# Patient Record
Sex: FEMALE | Race: WHITE | NOT HISPANIC OR LATINO | Employment: UNEMPLOYED | URBAN - METROPOLITAN AREA
[De-identification: names, ages, dates, MRNs, and addresses within clinical notes are randomized per-mention and may not be internally consistent; named-entity substitution may affect disease eponyms.]

---

## 2017-01-01 ENCOUNTER — HOSPITAL ENCOUNTER (EMERGENCY)
Facility: HOSPITAL | Age: 37
Discharge: HOME | End: 2017-01-02
Admitting: EMERGENCY MEDICINE

## 2017-01-01 ENCOUNTER — APPOINTMENT (EMERGENCY)
Dept: CT IMAGING | Facility: HOSPITAL | Age: 37
End: 2017-01-01

## 2017-01-01 DIAGNOSIS — S32.029A: Primary | ICD-10-CM

## 2017-01-01 DIAGNOSIS — S32.039A: ICD-10-CM

## 2017-01-01 LAB
ANION GAP SERPL CALCULATED.3IONS-SCNC: 9 MMOL/L (ref 4–13)
BASOPHILS # BLD AUTO: 0.06 THOUSANDS/ΜL (ref 0–0.1)
BASOPHILS NFR BLD AUTO: 1 % (ref 0–1)
BUN SERPL-MCNC: 18 MG/DL (ref 5–25)
CALCIUM SERPL-MCNC: 8.9 MG/DL (ref 8.3–10.1)
CHLORIDE SERPL-SCNC: 107 MMOL/L (ref 100–108)
CLARITY, POC: CLEAR
CO2 SERPL-SCNC: 27 MMOL/L (ref 21–32)
COLOR, POC: YELLOW
CREAT SERPL-MCNC: 0.76 MG/DL (ref 0.6–1.3)
EOSINOPHIL # BLD AUTO: 0.05 THOUSAND/ΜL (ref 0–0.61)
EOSINOPHIL NFR BLD AUTO: 1 % (ref 0–6)
ERYTHROCYTE [DISTWIDTH] IN BLOOD BY AUTOMATED COUNT: 11.9 % (ref 11.6–15.1)
EXT BILIRUBIN, UA: NORMAL
EXT BLOOD URINE: NORMAL
EXT GLUCOSE, UA: NORMAL
EXT KETONES: NORMAL
EXT NITRITE, UA: NORMAL
EXT PH, UA: 6
EXT PROTEIN, UA: NORMAL
EXT SPECIFIC GRAVITY, UA: 1.02
EXT UROBILINOGEN: 0.2
GFR SERPL CREATININE-BSD FRML MDRD: >60 ML/MIN/1.73SQ M
GLUCOSE SERPL-MCNC: 116 MG/DL (ref 65–140)
HCG UR QL: NORMAL
HCT VFR BLD AUTO: 41.2 % (ref 34.8–46.1)
HGB BLD-MCNC: 13.7 G/DL (ref 11.5–15.4)
LYMPHOCYTES # BLD AUTO: 2.59 THOUSANDS/ΜL (ref 0.6–4.47)
LYMPHOCYTES NFR BLD AUTO: 24 % (ref 14–44)
MCH RBC QN AUTO: 29.8 PG (ref 26.8–34.3)
MCHC RBC AUTO-ENTMCNC: 33.3 G/DL (ref 31.4–37.4)
MCV RBC AUTO: 90 FL (ref 82–98)
MONOCYTES # BLD AUTO: 0.48 THOUSAND/ΜL (ref 0.17–1.22)
MONOCYTES NFR BLD AUTO: 5 % (ref 4–12)
NEUTROPHILS # BLD AUTO: 7.5 THOUSANDS/ΜL (ref 1.85–7.62)
NEUTS SEG NFR BLD AUTO: 70 % (ref 43–75)
NRBC BLD AUTO-RTO: 0 /100 WBCS
PLATELET # BLD AUTO: 258 THOUSANDS/UL (ref 149–390)
PMV BLD AUTO: 9.4 FL (ref 8.9–12.7)
POTASSIUM SERPL-SCNC: 3.5 MMOL/L (ref 3.5–5.3)
RBC # BLD AUTO: 4.6 MILLION/UL (ref 3.81–5.12)
SODIUM SERPL-SCNC: 143 MMOL/L (ref 136–145)
WBC # BLD AUTO: 10.71 THOUSAND/UL (ref 4.31–10.16)
WBC # BLD EST: NORMAL 10*3/UL

## 2017-01-01 PROCEDURE — 36415 COLL VENOUS BLD VENIPUNCTURE: CPT | Performed by: PHYSICIAN ASSISTANT

## 2017-01-01 PROCEDURE — 81002 URINALYSIS NONAUTO W/O SCOPE: CPT | Performed by: PHYSICIAN ASSISTANT

## 2017-01-01 PROCEDURE — 74177 CT ABD & PELVIS W/CONTRAST: CPT

## 2017-01-01 PROCEDURE — 81025 URINE PREGNANCY TEST: CPT | Performed by: PHYSICIAN ASSISTANT

## 2017-01-01 PROCEDURE — 96376 TX/PRO/DX INJ SAME DRUG ADON: CPT

## 2017-01-01 PROCEDURE — 70450 CT HEAD/BRAIN W/O DYE: CPT

## 2017-01-01 PROCEDURE — 72125 CT NECK SPINE W/O DYE: CPT

## 2017-01-01 PROCEDURE — 85025 COMPLETE CBC W/AUTO DIFF WBC: CPT | Performed by: PHYSICIAN ASSISTANT

## 2017-01-01 PROCEDURE — 96361 HYDRATE IV INFUSION ADD-ON: CPT

## 2017-01-01 PROCEDURE — 96374 THER/PROPH/DIAG INJ IV PUSH: CPT

## 2017-01-01 PROCEDURE — 80048 BASIC METABOLIC PNL TOTAL CA: CPT | Performed by: PHYSICIAN ASSISTANT

## 2017-01-01 RX ADMIN — SODIUM CHLORIDE 1000 ML: 0.9 INJECTION, SOLUTION INTRAVENOUS at 21:48

## 2017-01-01 RX ADMIN — IOHEXOL 100 ML: 350 INJECTION, SOLUTION INTRAVENOUS at 23:17

## 2017-01-01 RX ADMIN — HYDROMORPHONE HYDROCHLORIDE 0.5 MG: 1 INJECTION, SOLUTION INTRAMUSCULAR; INTRAVENOUS; SUBCUTANEOUS at 21:43

## 2017-01-01 RX ADMIN — HYDROMORPHONE HYDROCHLORIDE 0.5 MG: 1 INJECTION, SOLUTION INTRAMUSCULAR; INTRAVENOUS; SUBCUTANEOUS at 22:56

## 2017-01-02 VITALS
BODY MASS INDEX: 22.76 KG/M2 | HEIGHT: 67 IN | WEIGHT: 145 LBS | OXYGEN SATURATION: 100 % | SYSTOLIC BLOOD PRESSURE: 112 MMHG | HEART RATE: 78 BPM | RESPIRATION RATE: 18 BRPM | DIASTOLIC BLOOD PRESSURE: 78 MMHG | TEMPERATURE: 98.5 F

## 2017-01-02 PROCEDURE — 99284 EMERGENCY DEPT VISIT MOD MDM: CPT

## 2017-01-02 RX ORDER — OXYCODONE HYDROCHLORIDE AND ACETAMINOPHEN 5; 325 MG/1; MG/1
1 TABLET ORAL EVERY 4 HOURS PRN
Qty: 20 TABLET | Refills: 0 | Status: SHIPPED | OUTPATIENT
Start: 2017-01-02 | End: 2017-01-12

## 2017-01-02 RX ORDER — METHOCARBAMOL 500 MG/1
500 TABLET, FILM COATED ORAL 3 TIMES DAILY PRN
Qty: 21 TABLET | Refills: 0 | Status: SHIPPED | OUTPATIENT
Start: 2017-01-02 | End: 2017-02-01

## 2017-01-02 RX ORDER — IBUPROFEN 600 MG/1
600 TABLET ORAL EVERY 8 HOURS PRN
Qty: 30 TABLET | Refills: 0 | Status: SHIPPED | OUTPATIENT
Start: 2017-01-02 | End: 2017-02-01

## 2017-01-02 RX ORDER — DIAZEPAM 5 MG/1
5 TABLET ORAL ONCE
Status: COMPLETED | OUTPATIENT
Start: 2017-01-02 | End: 2017-01-02

## 2017-01-02 RX ADMIN — DIAZEPAM 5 MG: 5 TABLET ORAL at 00:33

## 2022-11-01 ENCOUNTER — OFFICE VISIT (OUTPATIENT)
Dept: FAMILY MEDICINE CLINIC | Facility: CLINIC | Age: 42
End: 2022-11-01

## 2022-11-01 VITALS
OXYGEN SATURATION: 99 % | BODY MASS INDEX: 22.29 KG/M2 | WEIGHT: 142 LBS | HEIGHT: 67 IN | SYSTOLIC BLOOD PRESSURE: 120 MMHG | RESPIRATION RATE: 16 BRPM | DIASTOLIC BLOOD PRESSURE: 68 MMHG | TEMPERATURE: 98.3 F | HEART RATE: 68 BPM

## 2022-11-01 DIAGNOSIS — Z12.31 BREAST CANCER SCREENING BY MAMMOGRAM: ICD-10-CM

## 2022-11-01 DIAGNOSIS — Z87.442 HISTORY OF NEPHROLITHIASIS: ICD-10-CM

## 2022-11-01 DIAGNOSIS — H60.543 ECZEMA OF EXTERNAL EAR, BILATERAL: ICD-10-CM

## 2022-11-01 DIAGNOSIS — Z00.00 ANNUAL PHYSICAL EXAM: Primary | ICD-10-CM

## 2022-11-01 NOTE — PROGRESS NOTES
[place] [id number] [address]    NAME: José Miguel Nipple  AGE: 40 y o  SEX: female  : 1980     DATE: 2022     Assessment and Plan:     Problem List Items Addressed This Visit        Nervous and Auditory    Eczema of external ear, bilateral       Other    History of nephrolithiasis    Relevant Orders    Comprehensive metabolic panel      Other Visit Diagnoses     Annual physical exam    -  Primary    Relevant Orders    CBC and differential    Comprehensive metabolic panel    Lipid panel    Breast cancer screening by mammogram        Relevant Orders    Mammo screening bilateral w 3d & cad        Immunizations and preventive care screenings were discussed with patient today  Appropriate education was printed on patient's after visit summary  Counseling:  Alcohol/drug use: discussed moderation in alcohol intake, the recommendations for healthy alcohol use, and avoidance of illicit drug use  Dental Health: discussed importance of regular tooth brushing, flossing, and dental visits  Sexual health: discussed sexually transmitted diseases, partner selection, use of condoms, avoidance of unintended pregnancy, and contraceptive alternatives  Exercise: the importance of regular exercise/physical activity was discussed  Recommend exercise 3-5 times per week for at least 30 minutes  No follow-ups on file  Chief Complaint:     Chief Complaint   Patient presents with   • New Patient Visit      History of Present Illness:     Adult Annual Physical   Patient here for a comprehensive physical exam  The patient reports no problems  Diet and Physical Activity  Diet/Nutrition: well balanced diet  Exercise: no formal exercise        Depression Screening  PHQ-2/9 Depression Screening    Little interest or pleasure in doing things: 0 - not at all  Feeling down, depressed, or hopeless: 0 - not at all  PHQ-2 Score: 0  PHQ-2 Interpretation: Negative depression screen       General Health  Sleep: sleeps well  Hearing: normal - left and decreased - right  This has been since childhood  Vision: no vision problems  Dental: regular dental visits, brushes teeth twice daily and flosses teeth occasionally  /GYN Health  Patient is: premenopausal  Last menstrual period: 10/15/22  Contraceptive method:  had vasectomy  Review of Systems:     Review of Systems   Past Medical History:     Past Medical History:   Diagnosis Date   • Gestational diabetes    • Kidney stone       Past Surgical History:     History reviewed  No pertinent surgical history     Social History:     Social History     Socioeconomic History   • Marital status: /Civil Union     Spouse name: None   • Number of children: None   • Years of education: None   • Highest education level: None   Occupational History   • None   Tobacco Use   • Smoking status: Never Smoker   • Smokeless tobacco: Never Used   Substance and Sexual Activity   • Alcohol use: Yes     Comment: socially    • Drug use: Never   • Sexual activity: Yes     Partners: Male   Other Topics Concern   • None   Social History Narrative   • None     Social Determinants of Health     Financial Resource Strain: Not on file   Food Insecurity: Not on file   Transportation Needs: Not on file   Physical Activity: Not on file   Stress: Not on file   Social Connections: Not on file   Intimate Partner Violence: Not on file   Housing Stability: Not on file      Family History:     Family History   Problem Relation Age of Onset   • Diabetes Father    • Diabetes Paternal Grandmother       Current Medications:     Current Outpatient Medications   Medication Sig Dispense Refill   • ibuprofen (MOTRIN) 600 mg tablet Take 1 tablet by mouth every 8 (eight) hours as needed for mild pain for up to 30 days 30 tablet 0   • methocarbamol (ROBAXIN) 500 mg tablet Take 1 tablet by mouth 3 (three) times a day as needed for muscle spasms for up to 30 days Prn muscle spasms 21 tablet 0     No current facility-administered medications for this visit  Allergies:     No Known Allergies   Physical Exam:     /68 (BP Location: Left arm, Patient Position: Sitting, Cuff Size: Standard)   Pulse 68   Temp 98 3 °F (36 8 °C) (Tympanic)   Resp 16   Ht 5' 7" (1 702 m)   Wt 64 4 kg (142 lb)   SpO2 99%   BMI 22 24 kg/m²     Physical Exam  Vitals reviewed  Constitutional:       General: She is not in acute distress  Appearance: Normal appearance  HENT:      Head: Normocephalic and atraumatic  Right Ear: Tympanic membrane and external ear normal       Left Ear: Tympanic membrane and external ear normal       Ears:      Comments: Dry, flaking ear canals     Nose: Nose normal       Mouth/Throat:      Mouth: Mucous membranes are moist    Eyes:      Extraocular Movements: Extraocular movements intact  Conjunctiva/sclera: Conjunctivae normal       Pupils: Pupils are equal, round, and reactive to light  Cardiovascular:      Rate and Rhythm: Normal rate and regular rhythm  Heart sounds: Normal heart sounds  Pulmonary:      Effort: Pulmonary effort is normal  No respiratory distress  Breath sounds: Normal breath sounds  Abdominal:      General: Bowel sounds are normal  There is no distension  Palpations: Abdomen is soft  Tenderness: There is no abdominal tenderness  Musculoskeletal:      Cervical back: Neck supple  Right lower leg: No edema  Left lower leg: No edema  Lymphadenopathy:      Cervical: No cervical adenopathy  Skin:     General: Skin is warm  Capillary Refill: Capillary refill takes less than 2 seconds  Findings: No rash  Neurological:      Mental Status: She is alert  Mental status is at baseline          Lillis Prader, DO  [place] [id number] [address]

## 2022-11-01 NOTE — PATIENT INSTRUCTIONS

## 2023-02-22 ENCOUNTER — HOSPITAL ENCOUNTER (OUTPATIENT)
Dept: MAMMOGRAPHY | Facility: CLINIC | Age: 43
Discharge: HOME | End: 2023-02-22

## 2023-02-22 ENCOUNTER — APPOINTMENT (OUTPATIENT)
Dept: LAB | Facility: CLINIC | Age: 43
End: 2023-02-22

## 2023-02-22 VITALS — WEIGHT: 142 LBS | BODY MASS INDEX: 22.29 KG/M2 | HEIGHT: 67 IN

## 2023-02-22 DIAGNOSIS — Z00.00 ANNUAL PHYSICAL EXAM: ICD-10-CM

## 2023-02-22 DIAGNOSIS — Z12.31 BREAST CANCER SCREENING BY MAMMOGRAM: ICD-10-CM

## 2023-02-22 DIAGNOSIS — Z87.442 HISTORY OF NEPHROLITHIASIS: ICD-10-CM

## 2023-02-22 LAB
ALBUMIN SERPL BCP-MCNC: 3.9 G/DL (ref 3.5–5)
ALP SERPL-CCNC: 45 U/L (ref 46–116)
ALT SERPL W P-5'-P-CCNC: 22 U/L (ref 12–78)
ANION GAP SERPL CALCULATED.3IONS-SCNC: 2 MMOL/L (ref 4–13)
AST SERPL W P-5'-P-CCNC: 18 U/L (ref 5–45)
BASOPHILS # BLD AUTO: 0.05 THOUSANDS/ÂΜL (ref 0–0.1)
BASOPHILS NFR BLD AUTO: 1 % (ref 0–1)
BILIRUB SERPL-MCNC: 0.55 MG/DL (ref 0.2–1)
BUN SERPL-MCNC: 14 MG/DL (ref 5–25)
CALCIUM SERPL-MCNC: 9.1 MG/DL (ref 8.3–10.1)
CHLORIDE SERPL-SCNC: 109 MMOL/L (ref 96–108)
CHOLEST SERPL-MCNC: 199 MG/DL
CO2 SERPL-SCNC: 29 MMOL/L (ref 21–32)
CREAT SERPL-MCNC: 0.82 MG/DL (ref 0.6–1.3)
EOSINOPHIL # BLD AUTO: 0.08 THOUSAND/ÂΜL (ref 0–0.61)
EOSINOPHIL NFR BLD AUTO: 1 % (ref 0–6)
ERYTHROCYTE [DISTWIDTH] IN BLOOD BY AUTOMATED COUNT: 12 % (ref 11.6–15.1)
GFR SERPL CREATININE-BSD FRML MDRD: 89 ML/MIN/1.73SQ M
GLUCOSE P FAST SERPL-MCNC: 92 MG/DL (ref 65–99)
HCT VFR BLD AUTO: 41.5 % (ref 34.8–46.1)
HDLC SERPL-MCNC: 68 MG/DL
HGB BLD-MCNC: 13.8 G/DL (ref 11.5–15.4)
IMM GRANULOCYTES # BLD AUTO: 0.02 THOUSAND/UL (ref 0–0.2)
IMM GRANULOCYTES NFR BLD AUTO: 0 % (ref 0–2)
LDLC SERPL CALC-MCNC: 118 MG/DL (ref 0–100)
LYMPHOCYTES # BLD AUTO: 2.06 THOUSANDS/ÂΜL (ref 0.6–4.47)
LYMPHOCYTES NFR BLD AUTO: 36 % (ref 14–44)
MCH RBC QN AUTO: 30.7 PG (ref 26.8–34.3)
MCHC RBC AUTO-ENTMCNC: 33.3 G/DL (ref 31.4–37.4)
MCV RBC AUTO: 92 FL (ref 82–98)
MONOCYTES # BLD AUTO: 0.28 THOUSAND/ÂΜL (ref 0.17–1.22)
MONOCYTES NFR BLD AUTO: 5 % (ref 4–12)
NEUTROPHILS # BLD AUTO: 3.26 THOUSANDS/ÂΜL (ref 1.85–7.62)
NEUTS SEG NFR BLD AUTO: 57 % (ref 43–75)
NONHDLC SERPL-MCNC: 131 MG/DL
NRBC BLD AUTO-RTO: 0 /100 WBCS
PLATELET # BLD AUTO: 237 THOUSANDS/UL (ref 149–390)
PMV BLD AUTO: 10.4 FL (ref 8.9–12.7)
POTASSIUM SERPL-SCNC: 3.9 MMOL/L (ref 3.5–5.3)
PROT SERPL-MCNC: 7.4 G/DL (ref 6.4–8.4)
RBC # BLD AUTO: 4.49 MILLION/UL (ref 3.81–5.12)
SODIUM SERPL-SCNC: 140 MMOL/L (ref 135–147)
TRIGL SERPL-MCNC: 64 MG/DL
WBC # BLD AUTO: 5.75 THOUSAND/UL (ref 4.31–10.16)

## 2023-02-27 NOTE — RESULT ENCOUNTER NOTE
Negative mammogram, follow up with routine screening in 1 year       DO Daniela [facility]  2/27/2023 7:35 AM

## 2023-05-15 ENCOUNTER — APPOINTMENT (OUTPATIENT)
Dept: LAB | Facility: HOSPITAL | Age: 43
End: 2023-05-15

## 2023-05-15 DIAGNOSIS — N20.0 CALCULUS OF KIDNEY: ICD-10-CM

## 2023-05-15 LAB
ALBUMIN SERPL BCP-MCNC: 4.1 G/DL (ref 3.5–5)
ALP SERPL-CCNC: 44 U/L (ref 34–104)
ALT SERPL W P-5'-P-CCNC: 11 U/L (ref 7–52)
ANION GAP SERPL CALCULATED.3IONS-SCNC: 4 MMOL/L (ref 4–13)
AST SERPL W P-5'-P-CCNC: 14 U/L (ref 13–39)
BILIRUB SERPL-MCNC: 0.44 MG/DL (ref 0.2–1)
BUN SERPL-MCNC: 16 MG/DL (ref 5–25)
CALCIUM SERPL-MCNC: 9.1 MG/DL (ref 8.4–10.2)
CHLORIDE SERPL-SCNC: 106 MMOL/L (ref 96–108)
CO2 SERPL-SCNC: 29 MMOL/L (ref 21–32)
CREAT SERPL-MCNC: 0.86 MG/DL (ref 0.6–1.3)
GFR SERPL CREATININE-BSD FRML MDRD: 84 ML/MIN/1.73SQ M
GLUCOSE P FAST SERPL-MCNC: 99 MG/DL (ref 65–99)
POTASSIUM SERPL-SCNC: 3.8 MMOL/L (ref 3.5–5.3)
PROT SERPL-MCNC: 6.8 G/DL (ref 6.4–8.4)
PTH-INTACT SERPL-MCNC: 53.1 PG/ML (ref 18.4–80.1)
SODIUM SERPL-SCNC: 139 MMOL/L (ref 135–147)

## 2023-05-16 ENCOUNTER — HOSPITAL ENCOUNTER (OUTPATIENT)
Dept: ULTRASOUND IMAGING | Facility: HOSPITAL | Age: 43
Discharge: HOME | End: 2023-05-16

## 2023-05-16 DIAGNOSIS — N20.0 CALCULUS OF KIDNEY: ICD-10-CM

## 2023-08-16 ENCOUNTER — OFFICE VISIT (OUTPATIENT)
Dept: FAMILY MEDICINE CLINIC | Facility: CLINIC | Age: 43
End: 2023-08-16
Payer: COMMERCIAL

## 2023-08-16 VITALS
BODY MASS INDEX: 23.86 KG/M2 | WEIGHT: 152 LBS | HEART RATE: 69 BPM | TEMPERATURE: 95.6 F | SYSTOLIC BLOOD PRESSURE: 110 MMHG | HEIGHT: 67 IN | OXYGEN SATURATION: 97 % | DIASTOLIC BLOOD PRESSURE: 70 MMHG

## 2023-08-16 DIAGNOSIS — M25.50 POLYARTHRALGIA: Primary | ICD-10-CM

## 2023-08-16 DIAGNOSIS — G89.29 CHRONIC LEFT SHOULDER PAIN: ICD-10-CM

## 2023-08-16 DIAGNOSIS — M79.672 LEFT FOOT PAIN: ICD-10-CM

## 2023-08-16 DIAGNOSIS — M25.512 CHRONIC LEFT SHOULDER PAIN: ICD-10-CM

## 2023-08-16 PROCEDURE — 99214 OFFICE O/P EST MOD 30 MIN: CPT | Performed by: PHYSICIAN ASSISTANT

## 2023-08-16 NOTE — PROGRESS NOTES
Assessment/Plan:          Diagnoses and all orders for this visit:    Polyarthralgia  -     CBC and differential; Future  -     Comprehensive metabolic panel; Future  -     Uric acid; Future  -     CACHORRO Screen w/ Reflex to Titer/Pattern; Future  -     C-reactive protein; Future  -     Lyme Disease Serology w/Reflex; Future  -     RF Screen w/ Reflex to Titer; Future  -     TSH, 3rd generation; Future    Left foot pain  -     XR foot 3+ vw left; Future    Chronic left shoulder pain  -     XR shoulder 2+ vw left; Future        Make appointment to follow up with PCP. Subjective:      Patient ID: Liz Rivera is a 40 y.o. female. Pt is here with several concerns. She would like labs ordered for her blood sugar. She eats a significant amount of sugar and is concerned. She has not been drinking much water on a regular basis and is encouraged to do so. She is also complaining of bilateral thumb pain, left shoulder pain and left great toe and heel pain. Gout is prevalent in her family. The following portions of the patient's history were reviewed and updated as appropriate:   She has a past medical history of Gestational diabetes and Kidney stone. ,  does not have any pertinent problems on file. ,   has no past surgical history on file. ,  family history includes Brain cancer in her sister; Breast cancer (age of onset: 48) in her maternal aunt; Breast cancer (age of onset: 59) in her mother; Cancer in her paternal aunt; Diabetes in her father and paternal grandmother; Lung cancer in her maternal grandfather; No Known Problems in her daughter. ,   reports that she has never smoked. She has never used smokeless tobacco. She reports current alcohol use. She reports that she does not use drugs. ,  has No Known Allergies. .  Current Outpatient Medications   Medication Sig Dispense Refill   • ibuprofen (MOTRIN) 600 mg tablet Take 1 tablet by mouth every 8 (eight) hours as needed for mild pain for up to 30 days 30 tablet 0 • methocarbamol (ROBAXIN) 500 mg tablet Take 1 tablet by mouth 3 (three) times a day as needed for muscle spasms for up to 30 days Prn muscle spasms 21 tablet 0     No current facility-administered medications for this visit. Review of Systems   Constitutional: Negative for chills, diaphoresis and fatigue. Endocrine: Negative for polydipsia, polyphagia and polyuria. Musculoskeletal: Positive for arthralgias. Negative for back pain, gait problem, joint swelling, myalgias and neck pain. Neurological: Negative for dizziness and light-headedness. Objective:  Vitals:    08/16/23 1042   BP: 110/70   BP Location: Left arm   Patient Position: Sitting   Cuff Size: Standard   Pulse: 69   Temp: (!) 95.6 °F (35.3 °C)   TempSrc: Tympanic   SpO2: 97%   Weight: 68.9 kg (152 lb)   Height: 5' 7" (1.702 m)     Body mass index is 23.81 kg/m². Physical Exam  Vitals and nursing note reviewed. Constitutional:       Appearance: Normal appearance. She is normal weight. Eyes:      Conjunctiva/sclera: Conjunctivae normal.   Cardiovascular:      Rate and Rhythm: Normal rate. Pulmonary:      Effort: Pulmonary effort is normal.   Musculoskeletal:      Right lower leg: No edema. Left lower leg: No edema. Comments: tenderness on plantar surface of left heel. No pain in PIP joints of thumbs, no swelling    Neurological:      Mental Status: She is alert and oriented to person, place, and time.    Psychiatric:         Mood and Affect: Mood normal.         Behavior: Behavior normal.

## 2023-12-02 ENCOUNTER — HOSPITAL ENCOUNTER (OUTPATIENT)
Dept: RADIOLOGY | Facility: HOSPITAL | Age: 43
Discharge: HOME | End: 2023-12-02
Payer: COMMERCIAL

## 2023-12-02 ENCOUNTER — APPOINTMENT (OUTPATIENT)
Dept: LAB | Facility: HOSPITAL | Age: 43
End: 2023-12-02
Payer: COMMERCIAL

## 2023-12-02 DIAGNOSIS — M25.50 POLYARTHRALGIA: Primary | ICD-10-CM

## 2023-12-02 DIAGNOSIS — G89.29 CHRONIC LEFT SHOULDER PAIN: ICD-10-CM

## 2023-12-02 DIAGNOSIS — M25.512 CHRONIC LEFT SHOULDER PAIN: ICD-10-CM

## 2023-12-02 DIAGNOSIS — M79.672 LEFT FOOT PAIN: ICD-10-CM

## 2023-12-02 LAB
ALBUMIN SERPL BCP-MCNC: 4.3 G/DL (ref 3.5–5)
ALP SERPL-CCNC: 45 U/L (ref 34–104)
ALT SERPL W P-5'-P-CCNC: 17 U/L (ref 7–52)
ANION GAP SERPL CALCULATED.3IONS-SCNC: 6 MMOL/L
AST SERPL W P-5'-P-CCNC: 16 U/L (ref 13–39)
BASOPHILS # BLD AUTO: 0.05 THOUSANDS/ÂΜL (ref 0–0.1)
BASOPHILS NFR BLD AUTO: 1 % (ref 0–1)
BILIRUB SERPL-MCNC: 0.44 MG/DL (ref 0.2–1)
BUN SERPL-MCNC: 26 MG/DL (ref 5–25)
CALCIUM SERPL-MCNC: 9.1 MG/DL (ref 8.4–10.2)
CHLORIDE SERPL-SCNC: 108 MMOL/L (ref 96–108)
CO2 SERPL-SCNC: 27 MMOL/L (ref 21–32)
CREAT SERPL-MCNC: 0.91 MG/DL (ref 0.6–1.3)
CRP SERPL QL: <1 MG/L
EOSINOPHIL # BLD AUTO: 0.09 THOUSAND/ÂΜL (ref 0–0.61)
EOSINOPHIL NFR BLD AUTO: 2 % (ref 0–6)
ERYTHROCYTE [DISTWIDTH] IN BLOOD BY AUTOMATED COUNT: 11.8 % (ref 11.6–15.1)
GFR SERPL CREATININE-BSD FRML MDRD: 78 ML/MIN/1.73SQ M
GLUCOSE P FAST SERPL-MCNC: 96 MG/DL (ref 65–99)
HCT VFR BLD AUTO: 41.1 % (ref 34.8–46.1)
HGB BLD-MCNC: 13.7 G/DL (ref 11.5–15.4)
IMM GRANULOCYTES # BLD AUTO: 0.01 THOUSAND/UL (ref 0–0.2)
IMM GRANULOCYTES NFR BLD AUTO: 0 % (ref 0–2)
LYMPHOCYTES # BLD AUTO: 1.86 THOUSANDS/ÂΜL (ref 0.6–4.47)
LYMPHOCYTES NFR BLD AUTO: 33 % (ref 14–44)
MCH RBC QN AUTO: 30.3 PG (ref 26.8–34.3)
MCHC RBC AUTO-ENTMCNC: 33.3 G/DL (ref 31.4–37.4)
MCV RBC AUTO: 91 FL (ref 82–98)
MONOCYTES # BLD AUTO: 0.31 THOUSAND/ÂΜL (ref 0.17–1.22)
MONOCYTES NFR BLD AUTO: 6 % (ref 4–12)
NEUTROPHILS # BLD AUTO: 3.32 THOUSANDS/ÂΜL (ref 1.85–7.62)
NEUTS SEG NFR BLD AUTO: 58 % (ref 43–75)
NRBC BLD AUTO-RTO: 0 /100 WBCS
PLATELET # BLD AUTO: 272 THOUSANDS/UL (ref 149–390)
PMV BLD AUTO: 9.3 FL (ref 8.9–12.7)
POTASSIUM SERPL-SCNC: 3.9 MMOL/L (ref 3.5–5.3)
PROT SERPL-MCNC: 7.3 G/DL (ref 6.4–8.4)
RBC # BLD AUTO: 4.52 MILLION/UL (ref 3.81–5.12)
SODIUM SERPL-SCNC: 141 MMOL/L (ref 135–147)
TSH SERPL DL<=0.05 MIU/L-ACNC: 0.71 UIU/ML (ref 0.45–4.5)
URATE SERPL-MCNC: 5.3 MG/DL (ref 2–7.5)
WBC # BLD AUTO: 5.64 THOUSAND/UL (ref 4.31–10.16)

## 2023-12-02 PROCEDURE — 86618 LYME DISEASE ANTIBODY: CPT

## 2023-12-02 PROCEDURE — 86140 C-REACTIVE PROTEIN: CPT

## 2023-12-02 PROCEDURE — 86617 LYME DISEASE ANTIBODY: CPT

## 2023-12-02 PROCEDURE — 85025 COMPLETE CBC W/AUTO DIFF WBC: CPT

## 2023-12-02 PROCEDURE — 84550 ASSAY OF BLOOD/URIC ACID: CPT

## 2023-12-02 PROCEDURE — 73630 X-RAY EXAM OF FOOT: CPT

## 2023-12-02 PROCEDURE — 86039 ANTINUCLEAR ANTIBODIES (ANA): CPT

## 2023-12-02 PROCEDURE — 86038 ANTINUCLEAR ANTIBODIES: CPT

## 2023-12-02 PROCEDURE — 86430 RHEUMATOID FACTOR TEST QUAL: CPT

## 2023-12-02 PROCEDURE — 84443 ASSAY THYROID STIM HORMONE: CPT

## 2023-12-02 PROCEDURE — 80053 COMPREHEN METABOLIC PANEL: CPT

## 2023-12-02 PROCEDURE — 73030 X-RAY EXAM OF SHOULDER: CPT

## 2023-12-02 PROCEDURE — 36415 COLL VENOUS BLD VENIPUNCTURE: CPT

## 2023-12-02 PROCEDURE — 86235 NUCLEAR ANTIGEN ANTIBODY: CPT

## 2023-12-02 PROCEDURE — 86225 DNA ANTIBODY NATIVE: CPT

## 2023-12-03 LAB
ANA SER QL IA: POSITIVE
B BURGDOR IGG SERPL QL IA: NEGATIVE
B BURGDOR IGG+IGM SER QL IA: POSITIVE
B BURGDOR IGM SERPL QL IA: ABNORMAL

## 2023-12-04 LAB
ANA HOMOGEN SER QL IF: NORMAL
ANA HOMOGEN TITR SER: NORMAL {TITER}
DSDNA AB SER-ACNC: <4 IU/ML
ENA RNP AB SER IA-ACNC: NEGATIVE
ENA SM AB SER IA-ACNC: NEGATIVE
ENA SM+RNP IGG SER-ACNC: NEGATIVE
ENA SS-A AB SER IA-ACNC: NEGATIVE
ENA SS-B AB SER IA-ACNC: NEGATIVE
RHEUMATOID FACT SER QL LA: NEGATIVE

## 2023-12-05 ENCOUNTER — TELEPHONE (OUTPATIENT)
Dept: FAMILY MEDICINE CLINIC | Facility: CLINIC | Age: 43
End: 2023-12-05

## 2023-12-05 DIAGNOSIS — M25.50 POLYARTHRALGIA: Primary | ICD-10-CM

## 2023-12-05 NOTE — TELEPHONE ENCOUNTER
Spoke with pt. Patient states if her Lyme testing is showing equivocal, does she need a antibiotic? Patient is requesting referral to rheumatology to be placed.

## 2023-12-05 NOTE — TELEPHONE ENCOUNTER
I did an ambulatory consult with them. It may take several days for their recommendation. Some autoimmune disorders can cause equivocal Lyme testing results.

## 2023-12-05 NOTE — TELEPHONE ENCOUNTER
Pt has been notified and all questions have been answered at this time. We will await correspondence from rheumatology. Pt expressed understanding.

## 2023-12-08 ENCOUNTER — TELEPHONE (OUTPATIENT)
Dept: FAMILY MEDICINE CLINIC | Facility: CLINIC | Age: 43
End: 2023-12-08

## 2023-12-08 ENCOUNTER — E-CONSULT (OUTPATIENT)
Dept: RHEUMATOLOGY | Facility: CLINIC | Age: 43
End: 2023-12-08
Payer: COMMERCIAL

## 2023-12-08 DIAGNOSIS — A69.20 LYME DISEASE: Primary | ICD-10-CM

## 2023-12-08 DIAGNOSIS — R76.8 POSITIVE ANA (ANTINUCLEAR ANTIBODY): ICD-10-CM

## 2023-12-08 DIAGNOSIS — M25.50 POLYARTHRALGIA: Primary | ICD-10-CM

## 2023-12-08 PROCEDURE — 99451 NTRPROF PH1/NTRNET/EHR 5/>: CPT | Performed by: INTERNAL MEDICINE

## 2023-12-08 RX ORDER — DOXYCYCLINE HYCLATE 100 MG
100 TABLET ORAL 2 TIMES DAILY
Qty: 28 TABLET | Refills: 0 | Status: SHIPPED | OUTPATIENT
Start: 2023-12-08 | End: 2023-12-22

## 2023-12-08 NOTE — PROGRESS NOTES
E-Consult  Lluvia Fragoso 44 y.o. female MRN: [id number]  Encounter Date: 12/08/23      Reason for Consult / Principal Problem: positive CACHORRO 1:160 in speckled pattern    Consulting Provider: Alayna Berrios MD    Requesting Provider: Sharad Singh       ASSESSMENT:  38yo female presented in mid-August 2023 with polyarthralgia. Labs were completed recently and revealed positive CACHORRO of 1:160 in speckled pattern and equivocal Lyme IgM. Other CACHORRO reflex returned negative. RECOMMENDATIONS:  Positive CACHORRO could be a false positive secondary to Lyme disease. Recommend starting treatment of Lyme disease with antibiotics. At same time, can have patient do rest of lupus activity labs to assess for underlying lupus activity: ESR, C3, C4, dsDNA, UA, urine protein/creatinine, which I have already placed orders for. Can further assess patient in rheum clinic if these results come back significantly abnormal.    Total time spent >5 min, >50% time spent reviewing/analyzing record, written report will be generated in the EMR. Sridhar Tapai

## 2023-12-08 NOTE — TELEPHONE ENCOUNTER
Pt stopped by the office - saw her test results through [address] [place] [address], pt aware of Julia's advisements / recommendations. Pt will call pharmacy to schedule  and will start abx. Pt requested her tests results, new  bw slips  and office notes, printed and handed directly to pt. Pt will have more labs done tomorrow. All questions/concerns were answered. Pt has no further questions. "Rheumatologist recommends treating for Lyme and has ordered more labs. I sent in Rx for antibiotics for lyme treatment. "    [facility] notified.

## 2023-12-09 ENCOUNTER — APPOINTMENT (OUTPATIENT)
Dept: LAB | Facility: HOSPITAL | Age: 43
End: 2023-12-09
Payer: COMMERCIAL

## 2023-12-09 LAB
BACTERIA UR QL AUTO: ABNORMAL /HPF
BILIRUB UR QL STRIP: NEGATIVE
C3 SERPL-MCNC: 102 MG/DL (ref 87–200)
C4 SERPL-MCNC: 19 MG/DL (ref 19–52)
CAOX CRY URNS QL MICRO: ABNORMAL /HPF
CLARITY UR: CLEAR
COLOR UR: YELLOW
CREAT UR-MCNC: 148 MG/DL
ERYTHROCYTE [SEDIMENTATION RATE] IN BLOOD: 8 MM/HOUR (ref 0–19)
GLUCOSE UR STRIP-MCNC: NEGATIVE MG/DL
HGB UR QL STRIP.AUTO: NEGATIVE
KETONES UR STRIP-MCNC: NEGATIVE MG/DL
LEUKOCYTE ESTERASE UR QL STRIP: NEGATIVE
MUCOUS THREADS UR QL AUTO: ABNORMAL
NITRITE UR QL STRIP: NEGATIVE
NON-SQ EPI CELLS URNS QL MICRO: ABNORMAL /HPF
PH UR STRIP.AUTO: 6 [PH]
PROT UR STRIP-MCNC: NEGATIVE MG/DL
PROT UR-MCNC: 10 MG/DL
PROT/CREAT UR: 0.07 MG/G{CREAT} (ref 0–0.1)
RBC #/AREA URNS AUTO: ABNORMAL /HPF
SP GR UR STRIP.AUTO: 1.02 (ref 1–1.03)
UROBILINOGEN UR STRIP-ACNC: <2 MG/DL
WBC #/AREA URNS AUTO: ABNORMAL /HPF

## 2023-12-09 PROCEDURE — 84156 ASSAY OF PROTEIN URINE: CPT | Performed by: INTERNAL MEDICINE

## 2023-12-09 PROCEDURE — 86225 DNA ANTIBODY NATIVE: CPT | Performed by: INTERNAL MEDICINE

## 2023-12-09 PROCEDURE — 85652 RBC SED RATE AUTOMATED: CPT | Performed by: INTERNAL MEDICINE

## 2023-12-09 PROCEDURE — 36415 COLL VENOUS BLD VENIPUNCTURE: CPT | Performed by: INTERNAL MEDICINE

## 2023-12-09 PROCEDURE — 82570 ASSAY OF URINE CREATININE: CPT | Performed by: INTERNAL MEDICINE

## 2023-12-09 PROCEDURE — 86160 COMPLEMENT ANTIGEN: CPT | Performed by: INTERNAL MEDICINE

## 2023-12-09 PROCEDURE — 81001 URINALYSIS AUTO W/SCOPE: CPT | Performed by: INTERNAL MEDICINE

## 2023-12-12 LAB — DSDNA AB SER-ACNC: <1 IU/ML (ref 0–9)

## 2024-01-08 ENCOUNTER — TELEPHONE (OUTPATIENT)
Dept: FAMILY MEDICINE CLINIC | Facility: CLINIC | Age: 44
End: 2024-01-08

## 2024-01-08 NOTE — TELEPHONE ENCOUNTER
Patient states she finished 2 wk course of ABX for Lyme Disease but doesn't feel any better - her arm/neck/shoulder all have inflammation still - she scheduled an appt on 1/24 per her requested date - she is asking should she get another blood test or not to check for the lyme?

## 2024-01-09 NOTE — TELEPHONE ENCOUNTER
Lyme testing is not necessary to repeat, this will not be helpful at this time as the antibodies do not go away with treatment.     Brianne Sal DO  [facility]  1/9/2024 7:59 AM

## 2024-01-24 ENCOUNTER — HOSPITAL ENCOUNTER (OUTPATIENT)
Dept: RADIOLOGY | Facility: HOSPITAL | Age: 44
Discharge: HOME | End: 2024-01-24
Payer: COMMERCIAL

## 2024-01-24 ENCOUNTER — OFFICE VISIT (OUTPATIENT)
Dept: FAMILY MEDICINE CLINIC | Facility: CLINIC | Age: 44
End: 2024-01-24
Payer: COMMERCIAL

## 2024-01-24 VITALS
SYSTOLIC BLOOD PRESSURE: 110 MMHG | TEMPERATURE: 97.6 F | WEIGHT: 155 LBS | DIASTOLIC BLOOD PRESSURE: 70 MMHG | HEART RATE: 79 BPM | HEIGHT: 67 IN | BODY MASS INDEX: 24.33 KG/M2 | OXYGEN SATURATION: 97 %

## 2024-01-24 DIAGNOSIS — M54.2 CHRONIC NECK PAIN: ICD-10-CM

## 2024-01-24 DIAGNOSIS — Z12.31 BREAST CANCER SCREENING BY MAMMOGRAM: ICD-10-CM

## 2024-01-24 DIAGNOSIS — M25.512 CHRONIC LEFT SHOULDER PAIN: ICD-10-CM

## 2024-01-24 DIAGNOSIS — G89.29 CHRONIC NECK PAIN: ICD-10-CM

## 2024-01-24 DIAGNOSIS — Z00.00 ANNUAL PHYSICAL EXAM: Primary | ICD-10-CM

## 2024-01-24 DIAGNOSIS — G89.29 CHRONIC LEFT SHOULDER PAIN: ICD-10-CM

## 2024-01-24 DIAGNOSIS — M54.6 CHRONIC LEFT-SIDED THORACIC BACK PAIN: ICD-10-CM

## 2024-01-24 DIAGNOSIS — G89.29 CHRONIC LEFT-SIDED THORACIC BACK PAIN: ICD-10-CM

## 2024-01-24 PROCEDURE — 72072 X-RAY EXAM THORAC SPINE 3VWS: CPT

## 2024-01-24 PROCEDURE — 72050 X-RAY EXAM NECK SPINE 4/5VWS: CPT

## 2024-01-24 PROCEDURE — 99396 PREV VISIT EST AGE 40-64: CPT | Performed by: FAMILY MEDICINE

## 2024-01-24 NOTE — PROGRESS NOTES
ADULT ANNUAL PHYSICAL  [facility] [address] [facility]    NAME: Carmen Romero  AGE: 43 y.o. SEX: female  : 1980     DATE: 2024     Assessment and Plan:     Problem List Items Addressed This Visit    None  Visit Diagnoses     Annual physical exam    -  Primary    Breast cancer screening by mammogram        Relevant Orders    Mammo screening bilateral w 3d & cad    Chronic neck pain        Relevant Orders    Ambulatory Referral to Physical Therapy    XR spine cervical complete 4 or 5 vw non injury    Chronic left shoulder pain        Relevant Orders    Ambulatory Referral to Physical Therapy    Chronic left-sided thoracic back pain        Relevant Orders    Ambulatory Referral to Physical Therapy    XR spine thoracic 3 vw        Immunizations and preventive care screenings were discussed with patient today. Appropriate education was printed on patient's after visit summary.    Counseling:  Alcohol/drug use: discussed moderation in alcohol intake, the recommendations for healthy alcohol use, and avoidance of illicit drug use.  Dental Health: discussed importance of regular tooth brushing, flossing, and dental visits.  Sexual health: discussed sexually transmitted diseases, partner selection, use of condoms, avoidance of unintended pregnancy, and contraceptive alternatives.  Exercise: the importance of regular exercise/physical activity was discussed. Recommend exercise 3-5 times per week for at least 30 minutes.      No follow-ups on file.     Chief Complaint:     Chief Complaint   Patient presents with   • Physical Exam     Annual PE       History of Present Illness:     Adult Annual Physical   Patient here for a comprehensive physical exam. The patient reports problems - as documented below .    Reports neck and pain on the left side. States that this has been present for about 2 years. PT helps, once per week with no skips, the pain  resolves. Has been using Ibuprofen, with relief. States that she has had x rays previously that were normal.     Has muscles relaxers, they help but states that she tries to avoid taking them.    Diet and Physical Activity  Diet/Nutrition: frequent junk food.   Exercise: moderate cardiovascular exercise, 3-4 times a week on average, and 30-60 minutes on average.      Depression Screening  PHQ-2/9 Depression Screening    Little interest or pleasure in doing things: 0 - not at all  Feeling down, depressed, or hopeless: 0 - not at all  PHQ-2 Score: 0  PHQ-2 Interpretation: Negative depression screen       General Health  Sleep: sleeps poorly. Notes that this has been ongoing for about 2 years.   Hearing: normal - bilateral.  Vision: most recent eye exam >1 year ago.   Dental: regular dental visits, brushes teeth twice daily, and flosses teeth occasionally.       /GYN Health  Follows with gynecology? No, declines pap smear   Patient is: premenopausal  Last menstrual period: 1/2/24  Contraceptive method: male partner had vasectomy.    Advanced Care Planning  Do you have an advanced directive? no  Do you have a durable medical power of ? no     Review of Systems:     Review of Systems     Past Medical History:     Past Medical History:   Diagnosis Date   • Gestational diabetes    • Kidney stone       Past Surgical History:     History reviewed. No pertinent surgical history.   Social History:     Social History     Socioeconomic History   • Marital status: /Civil Union     Spouse name: None   • Number of children: None   • Years of education: None   • Highest education level: None   Occupational History   • None   Tobacco Use   • Smoking status: Never   • Smokeless tobacco: Never   Vaping Use   • Vaping status: Never Used   Substance and Sexual Activity   • Alcohol use: Yes     Comment: socially    • Drug use: Never   • Sexual activity: Yes     Partners: Male     Birth control/protection: Male  "Sterilization   Other Topics Concern   • None   Social History Narrative   • None     Social Determinants of Health     Financial Resource Strain: Not on file   Food Insecurity: Not on file   Transportation Needs: Not on file   Physical Activity: Not on file   Stress: Not on file   Social Connections: Not on file   Intimate Partner Violence: Not on file   Housing Stability: Not on file      Family History:     Family History   Problem Relation Age of Onset   • Breast cancer Mother 64   • Diabetes Father    • Brain cancer Sister    • No Known Problems Daughter    • Lung cancer Maternal Grandfather    • Diabetes Paternal Grandmother    • Breast cancer Maternal Aunt 50   • Cancer Paternal Aunt       Current Medications:     Current Outpatient Medications   Medication Sig Dispense Refill   • ibuprofen (MOTRIN) 600 mg tablet Take 1 tablet by mouth every 8 (eight) hours as needed for mild pain for up to 30 days 30 tablet 0   • methocarbamol (ROBAXIN) 500 mg tablet Take 1 tablet by mouth 3 (three) times a day as needed for muscle spasms for up to 30 days Prn muscle spasms 21 tablet 0     No current facility-administered medications for this visit.      Allergies:     No Known Allergies   Physical Exam:     /70 (BP Location: Left arm, Patient Position: Sitting, Cuff Size: Adult)   Pulse 79   Temp 97.6 °F (36.4 °C) (Tympanic)   Ht 5' 7\" (1.702 m)   Wt 70.3 kg (155 lb)   SpO2 97%   BMI 24.28 kg/m²     Physical Exam  Vitals reviewed.   Constitutional:       General: She is not in acute distress.     Appearance: Normal appearance.   HENT:      Head: Normocephalic and atraumatic.      Right Ear: External ear normal.      Left Ear: External ear normal.      Nose: Nose normal.      Mouth/Throat:      Mouth: Mucous membranes are moist.   Eyes:      Extraocular Movements: Extraocular movements intact.      Conjunctiva/sclera: Conjunctivae normal.   Cardiovascular:      Rate and Rhythm: Normal rate and regular rhythm.     "  Heart sounds: Normal heart sounds.   Pulmonary:      Effort: Pulmonary effort is normal.      Breath sounds: Normal breath sounds.   Abdominal:      General: Bowel sounds are normal. There is no distension.      Palpations: Abdomen is soft.      Tenderness: There is no abdominal tenderness.   Musculoskeletal:      Cervical back: Neck supple. Spasms and tenderness present. No deformity or bony tenderness. Decreased range of motion (forward flexion limited due to discomfort).      Thoracic back: Spasms and tenderness present. No bony tenderness.      Right lower leg: No edema.      Left lower leg: No edema.   Lymphadenopathy:      Cervical: No cervical adenopathy.   Skin:     General: Skin is warm.      Capillary Refill: Capillary refill takes less than 2 seconds.      Findings: No rash.   Neurological:      Mental Status: She is alert. Mental status is at baseline.        Brianne Sal DO  [facility] [address] [facility]

## 2024-04-16 ENCOUNTER — HOSPITAL ENCOUNTER (OUTPATIENT)
Dept: ULTRASOUND IMAGING | Facility: CLINIC | Age: 44
Discharge: HOME | End: 2024-04-16
Payer: COMMERCIAL

## 2024-04-16 ENCOUNTER — HOSPITAL ENCOUNTER (OUTPATIENT)
Dept: MAMMOGRAPHY | Facility: CLINIC | Age: 44
Discharge: HOME | End: 2024-04-16
Payer: COMMERCIAL

## 2024-04-16 VITALS — HEIGHT: 67 IN | BODY MASS INDEX: 23.39 KG/M2 | WEIGHT: 149 LBS

## 2024-04-16 DIAGNOSIS — Z12.31 VISIT FOR SCREENING MAMMOGRAM: ICD-10-CM

## 2024-04-16 DIAGNOSIS — R92.2 INCONCLUSIVE MAMMOGRAPHY: ICD-10-CM

## 2024-04-16 PROCEDURE — 77063 BREAST TOMOSYNTHESIS BI: CPT

## 2024-04-16 PROCEDURE — 77067 SCR MAMMO BI INCL CAD: CPT

## 2024-04-16 PROCEDURE — 76641 ULTRASOUND BREAST COMPLETE: CPT

## 2024-11-05 ENCOUNTER — OFFICE VISIT (OUTPATIENT)
Dept: FAMILY MEDICINE CLINIC | Facility: CLINIC | Age: 44
End: 2024-11-05
Payer: COMMERCIAL

## 2024-11-05 VITALS
SYSTOLIC BLOOD PRESSURE: 106 MMHG | DIASTOLIC BLOOD PRESSURE: 74 MMHG | HEIGHT: 67 IN | OXYGEN SATURATION: 95 % | TEMPERATURE: 97.6 F | HEART RATE: 82 BPM | BODY MASS INDEX: 23.07 KG/M2 | WEIGHT: 147 LBS

## 2024-11-05 DIAGNOSIS — M25.522 LEFT ELBOW PAIN: Primary | ICD-10-CM

## 2024-11-05 DIAGNOSIS — M77.02 MEDIAL EPICONDYLITIS OF ELBOW, LEFT: ICD-10-CM

## 2024-11-05 PROCEDURE — 99214 OFFICE O/P EST MOD 30 MIN: CPT | Performed by: FAMILY MEDICINE

## 2024-11-05 RX ORDER — MELOXICAM 15 MG/1
15 TABLET ORAL DAILY
Qty: 30 TABLET | Refills: 0 | Status: SHIPPED | OUTPATIENT
Start: 2024-11-05

## 2024-11-05 NOTE — PROGRESS NOTES
"Ambulatory Visit  Name: Carmen Romero      : 1980      MRN: [id number]  Encounter Provider: Brianne Sal DO  Encounter Date: 2024   Encounter department: [facility] [address]    Assessment & Plan  Left elbow pain    Orders:    Ambulatory Referral to Orthopedic Surgery; Future    Medial epicondylitis of elbow, left    Orders:    Ambulatory Referral to Orthopedic Surgery; Future    meloxicam (Mobic) 15 mg tablet; Take 1 tablet (15 mg total) by mouth daily       History of Present Illness     HPI    Notes that she is having pain in her left elbow. States that this has been bothersome for the last 2 months. States that she has some numbness/tingling in her hand. States that this is all fingers. Notes that this is interfering with her exercise because she is having pain with . Notes that she has tenderness on the inside and outside of her elbow. States that this is the site of the pain with elbow movement. Requesting ortho evaluation.     Using Ibuprofen 800 mg and this is helpful but when it wears off, the pain returns.       Review of Systems      Objective     /74 (BP Location: Left arm, Patient Position: Sitting, Cuff Size: Standard)   Pulse 82   Temp 97.6 °F (36.4 °C) (Temporal)   Ht 5' 7\" (1.702 m)   Wt 66.7 kg (147 lb)   SpO2 95%   BMI 23.02 kg/m²     Physical Exam  Vitals reviewed.   Constitutional:       General: She is not in acute distress.     Appearance: Normal appearance.   HENT:      Head: Normocephalic and atraumatic.      Right Ear: External ear normal.      Left Ear: External ear normal.      Nose: Nose normal.      Mouth/Throat:      Mouth: Mucous membranes are moist.   Eyes:      Extraocular Movements: Extraocular movements intact.      Conjunctiva/sclera: Conjunctivae normal.   Cardiovascular:      Rate and Rhythm: Normal rate and regular rhythm.   Pulmonary:      Effort: Pulmonary effort is normal.      Breath sounds: Normal " breath sounds.   Abdominal:      General: Bowel sounds are normal.      Palpations: Abdomen is soft.   Musculoskeletal:      Right lower leg: No edema.      Left lower leg: No edema.      Comments: Left medial epicondyle tender to palpation. Pain with resisted flexion. Notes medial elbow pain with    Skin:     General: Skin is warm.      Capillary Refill: Capillary refill takes less than 2 seconds.      Findings: No rash.   Neurological:      Mental Status: She is alert. Mental status is at baseline.           DO Leandro Osei [facility]  11/5/2024 8:00 AM

## 2024-11-11 ENCOUNTER — TELEPHONE (OUTPATIENT)
Age: 44
End: 2024-11-11

## 2024-11-11 NOTE — TELEPHONE ENCOUNTER
Pt reports she went to get the requested xray by PCP. Pt states she went to the facility to get this done and they informed her the xray order wasn't placed. PCP please place this order as soon as possible so pt can get this done before Friday she states.  PC please call pt once this is order is placed.  Thank you

## 2024-11-15 ENCOUNTER — APPOINTMENT (OUTPATIENT)
Dept: RADIOLOGY | Facility: CLINIC | Age: 44
End: 2024-11-15
Payer: COMMERCIAL

## 2024-11-15 ENCOUNTER — OFFICE VISIT (OUTPATIENT)
Dept: OBGYN CLINIC | Facility: CLINIC | Age: 44
End: 2024-11-15
Payer: COMMERCIAL

## 2024-11-15 VITALS
SYSTOLIC BLOOD PRESSURE: 101 MMHG | WEIGHT: 147 LBS | HEART RATE: 89 BPM | BODY MASS INDEX: 23.07 KG/M2 | DIASTOLIC BLOOD PRESSURE: 69 MMHG | HEIGHT: 67 IN

## 2024-11-15 DIAGNOSIS — G56.02 CARPAL TUNNEL SYNDROME ON LEFT: ICD-10-CM

## 2024-11-15 DIAGNOSIS — M77.02 MEDIAL EPICONDYLITIS OF ELBOW, LEFT: ICD-10-CM

## 2024-11-15 DIAGNOSIS — M77.02 MEDIAL EPICONDYLITIS OF ELBOW, LEFT: Primary | ICD-10-CM

## 2024-11-15 DIAGNOSIS — M25.522 LEFT ELBOW PAIN: ICD-10-CM

## 2024-11-15 PROCEDURE — 99204 OFFICE O/P NEW MOD 45 MIN: CPT | Performed by: FAMILY MEDICINE

## 2024-11-15 PROCEDURE — 73080 X-RAY EXAM OF ELBOW: CPT

## 2024-11-15 RX ORDER — IBUPROFEN 800 MG/1
800 TABLET, FILM COATED ORAL EVERY 6 HOURS PRN
COMMUNITY

## 2024-11-15 RX ORDER — PREDNISONE 20 MG/1
20 TABLET ORAL 2 TIMES DAILY WITH MEALS
Qty: 10 TABLET | Refills: 0 | Status: SHIPPED | OUTPATIENT
Start: 2024-11-15 | End: 2024-11-20
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
## 2024-11-15 NOTE — PATIENT INSTRUCTIONS
Over the counter vitamins:    - Turmeric vitamin at least 1000 mg daily    - Tart cherry vitamin at least 1000 mg daily    - Glucosamine-chondrointin 2-3 times daily     Wrist brace at night    Over the counter diclofenac gel/voltaren  - 3 times daily    Copper elbow sleeve    Rx: Prednisone 20 mg  - twice daily   - eat first  - 5 days  - makes you energetic    Epsom [place]
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
## 2024-11-20 ENCOUNTER — DOCUMENTATION (OUTPATIENT)
Dept: OBGYN CLINIC | Facility: CLINIC | Age: 44
End: 2024-11-20

## 2024-12-05 ENCOUNTER — OFFICE VISIT (OUTPATIENT)
Dept: OBGYN CLINIC | Facility: CLINIC | Age: 44
End: 2024-12-05
Payer: COMMERCIAL

## 2024-12-05 VITALS
DIASTOLIC BLOOD PRESSURE: 65 MMHG | RESPIRATION RATE: 18 BRPM | SYSTOLIC BLOOD PRESSURE: 94 MMHG | HEIGHT: 67 IN | BODY MASS INDEX: 23.07 KG/M2 | OXYGEN SATURATION: 97 % | HEART RATE: 90 BPM | WEIGHT: 147 LBS

## 2024-12-05 DIAGNOSIS — M47.812 FACET ARTHROPATHY, CERVICAL: ICD-10-CM

## 2024-12-05 DIAGNOSIS — M50.30 DEGENERATIVE DISC DISEASE, CERVICAL: ICD-10-CM

## 2024-12-05 DIAGNOSIS — M62.838 TRAPEZIUS MUSCLE SPASM: ICD-10-CM

## 2024-12-05 DIAGNOSIS — M89.8X1 PERISCAPULAR PAIN: ICD-10-CM

## 2024-12-05 DIAGNOSIS — M54.12 RADICULOPATHY, CERVICAL REGION: Primary | ICD-10-CM

## 2024-12-05 DIAGNOSIS — M79.18 MYOFASCIAL PAIN: ICD-10-CM

## 2024-12-05 PROCEDURE — 99214 OFFICE O/P EST MOD 30 MIN: CPT | Performed by: FAMILY MEDICINE

## 2024-12-05 RX ORDER — CYCLOBENZAPRINE HCL 10 MG
10 TABLET ORAL 2 TIMES DAILY PRN
Qty: 60 TABLET | Refills: 0 | Status: SHIPPED | OUTPATIENT
Start: 2024-12-05

## 2024-12-05 RX ORDER — DICLOFENAC SODIUM 75 MG/1
75 TABLET, DELAYED RELEASE ORAL 2 TIMES DAILY
Qty: 60 TABLET | Refills: 1 | Status: SHIPPED | OUTPATIENT
Start: 2024-12-05

## 2024-12-05 NOTE — PATIENT INSTRUCTIONS
Stop Meloxicam    Stop Methocarbamol    Rx: Diclofenac 75 mg  - twice daily  - eat first  - EVERYDAY  - 30 days  - no ibuprofen  - no aleve  - tylenol is ok    Rx: Cyclobenzaprine 10 mg  - twice daily as needed  - not before driving  - may cause drowsiness    Obtain records for cervical spine MRI    Consult with spine specialist
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
## 2024-12-31 ENCOUNTER — TELEPHONE (OUTPATIENT)
Dept: PAIN MEDICINE | Facility: CLINIC | Age: 44
End: 2024-12-31

## 2025-01-13 ENCOUNTER — CONSULT (OUTPATIENT)
Dept: PAIN MEDICINE | Facility: CLINIC | Age: 45
End: 2025-01-13
Payer: COMMERCIAL

## 2025-01-13 VITALS — WEIGHT: 147 LBS | BODY MASS INDEX: 23.07 KG/M2 | HEIGHT: 67 IN

## 2025-01-13 DIAGNOSIS — G95.0 SYRINX OF SPINAL CORD (HCC): Primary | ICD-10-CM

## 2025-01-13 DIAGNOSIS — M79.18 MYOFASCIAL PAIN: ICD-10-CM

## 2025-01-13 DIAGNOSIS — M79.18 MYOFASCIAL PAIN SYNDROME: ICD-10-CM

## 2025-01-13 DIAGNOSIS — M89.8X1 PERISCAPULAR PAIN: ICD-10-CM

## 2025-01-13 DIAGNOSIS — M47.812 FACET ARTHROPATHY, CERVICAL: ICD-10-CM

## 2025-01-13 DIAGNOSIS — M54.12 RADICULOPATHY, CERVICAL REGION: ICD-10-CM

## 2025-01-13 PROCEDURE — 99204 OFFICE O/P NEW MOD 45 MIN: CPT | Performed by: STUDENT IN AN ORGANIZED HEALTH CARE EDUCATION/TRAINING PROGRAM

## 2025-01-13 NOTE — PATIENT INSTRUCTIONS
Patient Education     Trigger Point Injection   Why is this procedure done?   A trigger point is a very painful area in a muscle. You may have a lump or feel a knot. The trigger point causes pain right where it is, or in the area around the trigger point. You may have less movement in your neck, arm, or leg if you have a trigger point. Your pain may increase if someone presses on this area. You may have pain far away from the trigger point. You may even have pain when you are not moving.   This kind of injection is used to help lower pain. When your pain is less, you may be able to move more and do more physical therapy. The goal is to break the pain cycle at this very painful spot.  What will the results be?   Less pain  Less swelling in the nerves  Better movement  What happens before the procedure?   Your doctor will take your history and do an exam. Talk to the doctor about:  All the drugs you are taking. Be sure to include all prescription and over-the-counter (OTC) drugs, and herbal supplements. Tell the doctor about any drug allergy. Bring a list of drugs you take with you.  If you have high blood sugar or diabetes. Your drugs may need to be changed.  Any bleeding problems. Be sure to tell your doctor if you are taking any drugs that may cause bleeding. Some of these are warfarin, rivaroxaban, apixaban, ticagrelor, clopidogrel, ketorolac, ibuprofen, naproxen, or aspirin. Certain vitamins and herbs, such as garlic and fish oil, may also add to the risk for bleeding. You may need to stop these drugs as well. Talk to your doctor about them.  If you need to stop eating or drinking before your procedure.  You will not be allowed to drive right away after the procedure. Ask a family member or a friend to drive you home.  What happens during the procedure?   The painful area is cleaned with a special soap. Your doctor may give you a drug to numb the painful area. Once the point of the pain is located, your doctor  will inject the drug into this trigger point. The whole procedure will take only a few minutes.  What happens after the procedure?   You can go home after the procedure.  You will feel numb in the area where the shot is given.  You should feel less pain right away.  What care is needed at home?   Ask your doctor what you need to do when you go home. Make sure you ask questions if you do not understand what the doctor says. This way you will know what you need to do.   Your doctor or physical therapist may give you some muscle stretching exercises to do.  Apply ice to the injection site if it is sore. Place an ice pack or a bag of frozen peas wrapped in a towel over the painful part. Never put ice right on the skin. Do not leave the ice on more than 10 to 15 minutes at a time.  What follow-up care is needed?   Your doctor may ask you to make visits to the office to check on your progress. Be sure to keep these visits.  Your doctor may ask you to see a physical therapist for exercises. Be sure to keep these visits.  Your doctor may ask you to do exercises for the area that was affected by the trigger point. Do the exercises as directed at home.  What problems could happen?   Infection  Blood clot  Lung collapse or trouble breathing if the injection was in the chest or back  Muscle pain does not go away  When do I need to call the doctor?   Signs of infection. These include a fever of 100.4°F (38°C) or higher, chills.  Signs of infection at shot site. These include swelling, redness, warmth around the wound; too much pain when touched; yellowish, greenish, or bloody discharge.  Shortness of breath or chest pain. If you have this sign, go to the ER right away.  Numbness, tingling, or weakness where the shot was given  Last Reviewed Date   2020-10-13  Consumer Information Use and Disclaimer   This generalized information is a limited summary of diagnosis, treatment, and/or medication information. It is not meant to be  comprehensive and should be used as a tool to help the user understand and/or assess potential diagnostic and treatment options. It does NOT include all information about conditions, treatments, medications, side effects, or risks that may apply to a specific patient. It is not intended to be medical advice or a substitute for the medical advice, diagnosis, or treatment of a health care provider based on the health care provider's examination and assessment of a patient’s specific and unique circumstances. Patients must speak with a health care provider for complete information about their health, medical questions, and treatment options, including any risks or benefits regarding use of medications. This information does not endorse any treatments or medications as safe, effective, or approved for treating a specific patient. [employer] and its affiliates disclaim any warranty or liability relating to this information or the use thereof. The use of this information is governed by the Terms of Use, available at https://www.[employer].com/en/know/clinical-effectiveness-terms   Copyright   Copyright © 2024 [employer] and its affiliates and/or licensors. All rights reserved.

## 2025-01-13 NOTE — PROGRESS NOTES
Assessment:  1. Syrinx of spinal cord (HCC)    2. Radiculopathy, cervical region    3. Facet arthropathy, cervical    4. Periscapular pain    5. Myofascial pain    6. Myofascial pain syndrome        Plan:  Orders Placed This Encounter   Procedures    MRI cervical spine wo contrast     Standing Status:   Future     Expected Date:   1/13/2025     Expiration Date:   1/13/2029     Scheduling Instructions:      There is no preparation for this test. Please leave your jewelry and valuables at home, wedding rings are the exception. All patients will be required to change into a hospital gown and pants.  Street clothes are not permitted in the MRI.  Magnetic nail polish must be removed prior to arrival for your test. Please bring your insurance cards, a form of photo ID and a list of your medications with you. Arrive 15 minutes prior to your appointment time in order to register. Please bring any prior CT or MRI studies of this area that were not performed at a [facility].            To schedule this appointment, please contact Central Scheduling at [phone].            Prior to your appointment, please make sure you complete the MRI Screening Form when you e-Check in for your appointment. This will be available starting 7 days before your appointment in [employer]. You may receive an e-mail with an activation code if you do not have a [employer] account. If you do not have access to a device, we will complete your screening at your appointment.     Reason for Exam:   cervical syrinx and pain down left upper extremity     Is the patient pregnant?:   Unknown     What is the patient's sedation requirement?:   No Sedation     Does the patient need medication for Claustrophobia? If yes, order medication at this point.:   No     Does the patient wear a life vest, have an implanted cardiac device, a stimulation device, a sleep apnea stimulator, or a breast tissue expansion device?:   Unknown     Release to patient  through [employer]:   Immediate    FL spine and pain procedure     Standing Status:   Future     Expected Date:   1/13/2025     Expiration Date:   1/13/2029     Reason for Exam::   TPI USGI left     Anticoagulant hold needed?:   No     Is the patient pregnant?:   Unknown    EMG 1 Limb     Standing Status:   Future     Expiration Date:   1/13/2026     Location::   Left upper     Reason for Exam::   left periscapular and arm pain     Possible Diagnosis::   cervical neuropathy     Does the patient have an external cardiac device?:   No       New Medications Ordered This Visit   Medications    Turmeric 5-1000 MG CAPS     Sig: Take 1,000 mg by mouth 2 (two) times a day    Tart Vivas 1200 MG CAPS     Sig: Take by mouth       My impressions and treatment recommendations were discussed in detail with the patient, who verbalized understanding and had no further questions.    44-year-old female presents her office with complaints of left neck and parascapular pain as well as left elbow pain.  These are chronic issues for her.  She is neurologically intact on exam today.  She has notable myofascial pain in the left trapezius region which may be amenable to trigger point injection which was ordered today.  The pain down her arm does not run into clear radicular pattern.  I am going to order EMG of the left upper extremity to help further delineate source of arm pain.  She did have MRI of the cervical spine in 2021 which to me did not reveal any significant central or foraminal nerve depression, however it was noted central cord syrinx from C4-C7 roughly.  I am going to order updated scan due to worsening of her cervical symptoms.    I advised her to continue follow-up with sports medicine in regards to her elbow pain.  We will first see how much of her symptoms are relieved with trigger point injection.  There may be role for epidural steroid injection based on results of the EMG.      [place] Prescription Drug Monitoring  Program report was reviewed and was appropriate     Complete risks and benefits including bleeding, infection, tissue reaction, nerve injury and allergic reaction were discussed. The approach was demonstrated using models and literature was provided. Verbal and written consent was obtained.    Discharge instructions were provided. I personally saw and examined the patient and I agree with the above discussed plan of care.    History of Present Illness:    Carmen Romero is a 44 y.o. female who presents to [facility] for initial evaluation of the above stated pain complaints. The patient has a past medical and chronic pain history as outlined in the assessment section. She was referred by Aydin Lan [place], DO  [address]  [address]  [place] [address] .    Patient is here with chief complaint of left-sided neck and scapular pain with radiation to the left upper extremity.  Chronic issue for over 2 years. She did have ski accident in 2017 with subsequent back issues, but her neck and arm pain really became problematic in 2021. It has been getting worse over the last 2 years. Pain is moderate to severe over the past month.  Currently 3-4 out of 10.  Escalates to an 8 out of 10 with activity.  Pain is nearly constant.  Worse in the evening and nighttime.  Burning, shooting, numbness, sharp, dull/aching in nature.      She reports pain in the left trapezius region and also into the left tricep.  She also reports pain down the left upper extremity and unclear dermatomal pattern.  Radiates into the hands she also reports elbow pain as well. She gets weakness with her     There is subjective weakness of the upper extremities.    The pain is increased with lying down, sitting, exercise.  No change with standing, bending, walking, relaxation, coughing, sneezing.    She reports excellent relief with PT currently.  Moderate relief with exercise, heat/ice therapy, acupuncture,  chiropractic treatment.    No tobacco or marijuana use.  Not allergic to latex or contrast dye.    Past medications include codeine, oxycodone, naproxen with relief, gabapentin.  Currently using diclofenac, Voltaren gel, ibuprofen with relief, Flexeril with relief, oral steroids with relief    CT performed around time of accident noted T2-3 osseous fusion  Review of Systems:    Review of Systems   Musculoskeletal:  Positive for arthralgias, back pain, joint swelling, myalgias and neck pain.   Skin:  Positive for rash.   Neurological:  Positive for numbness.           Past Medical History:   Diagnosis Date    Gestational diabetes     Kidney stone        History reviewed. No pertinent surgical history.    Family History   Problem Relation Age of Onset    Breast cancer Mother 64    Diabetes Father     Brain cancer Sister     No Known Problems Daughter     Lung cancer Maternal Grandfather     Diabetes Paternal Grandmother     Breast cancer Maternal Aunt 50    Cancer Paternal Aunt        Social History     Occupational History    Not on file   Tobacco Use    Smoking status: Never    Smokeless tobacco: Never   Vaping Use    Vaping status: Never Used   Substance and Sexual Activity    Alcohol use: Yes     Comment: socially     Drug use: Never    Sexual activity: Yes     Partners: Male     Birth control/protection: Male Sterilization         Current Outpatient Medications:     cyclobenzaprine (FLEXERIL) 10 mg tablet, Take 1 tablet (10 mg total) by mouth 2 (two) times a day as needed for muscle spasms, Disp: 60 tablet, Rfl: 0    diclofenac (VOLTAREN) 75 mg EC tablet, Take 1 tablet (75 mg total) by mouth 2 (two) times a day, Disp: 60 tablet, Rfl: 1    ibuprofen (MOTRIN) 800 mg tablet, Take 800 mg by mouth every 6 (six) hours as needed for mild pain, Disp: , Rfl:     Tart Cherry 1200 MG CAPS, Take by mouth, Disp: , Rfl:     Turmeric 5-1000 MG CAPS, Take 1,000 mg by mouth 2 (two) times a day, Disp: , Rfl:     No Known  "Allergies    Physical Exam:    Ht 5' 7\" (1.702 m)   Wt 66.7 kg (147 lb)   BMI 23.02 kg/m²     Constitutional: normal, well developed, well nourished, alert, in no distress and non-toxic and no overt pain behavior.  Eyes: anicteric  HEENT: grossly intact  Neck: supple, symmetric, trachea midline and no masses   Pulmonary:even and unlabored  Cardiovascular:No edema or pitting edema present  Skin:Normal without rashes or lesions and well hydrated  Psychiatric:Mood and affect appropriate  Neurologic:Cranial Nerves II-XII grossly intact  Musculoskeletal:normal    Cervical Spine Exam    Appearance:  Normal lordosis  Palpation/Tenderness:  left cervical paraspinal tenderness  left trapezium tenderness  trigger points palpable  Sensory:  no sensory deficits noted  Range of Motion:  Limited leftward rotation  Motor Strength:  Left Arm Flexion  5/5  Left Arm Extension  5/5  Right Arm Flexion  5/5  Right Arm Extension  5/5  Left Wrist Flexion  5/5  Left Wrist Extension  5/5  Left Finger Abduction  5/5  Right Finger Abduction  5/5  Left Pincer Grasp  5/5  Right Pincer Grasp  5/5  Left    5/5  Right   5/5  Reflexes:  Left Biceps:  2+   Right Biceps:  2+   Left Brachioradialis:  2+   Right Brachioradialis:  2+   Left Triceps:  2+   Right Triceps:  2+     Imaging    CERVICAL SPINE         1/24/24     INDICATION:   Cervicalgia. Other chronic pain.      COMPARISON: None available     VIEWS:  XR SPINE CERVICAL COMPLETE 4 OR 5 VW NON INJURY   Images: 5     FINDINGS:     No fracture.      Slight degenerative anterolisthesis C4 on C5 with slight retrolisthesis C5 on C6 and C6 on C7. Overall straightening.     Disc space narrowing C5-6.     Neural foraminal narrowing C5-6 left greater than right.     The prevertebral soft tissues are within normal limits.       The lung apices are clear.     IMPRESSION:        No acute osseous abnormality.     Degenerative changes as above.  MRI cervical spine wo contrast    (Results Pending) "   FL spine and pain procedure    (Results Pending)       Orders Placed This Encounter   Procedures    MRI cervical spine wo contrast    FL spine and pain procedure    EMG 1 Limb

## 2025-01-17 ENCOUNTER — HOSPITAL ENCOUNTER (OUTPATIENT)
Dept: MRI IMAGING | Facility: HOSPITAL | Age: 45
End: 2025-01-17
Attending: STUDENT IN AN ORGANIZED HEALTH CARE EDUCATION/TRAINING PROGRAM
Payer: COMMERCIAL

## 2025-01-17 DIAGNOSIS — G95.0 SYRINX OF SPINAL CORD (HCC): ICD-10-CM

## 2025-01-17 DIAGNOSIS — M54.12 RADICULOPATHY, CERVICAL REGION: ICD-10-CM

## 2025-01-17 PROCEDURE — 72141 MRI NECK SPINE W/O DYE: CPT

## 2025-01-22 ENCOUNTER — RESULTS FOLLOW-UP (OUTPATIENT)
Dept: RADIOLOGY | Facility: CLINIC | Age: 45
End: 2025-01-22

## 2025-01-27 ENCOUNTER — PROCEDURE VISIT (OUTPATIENT)
Dept: PAIN MEDICINE | Facility: CLINIC | Age: 45
End: 2025-01-27
Payer: COMMERCIAL

## 2025-01-27 VITALS — WEIGHT: 147 LBS | HEIGHT: 67 IN | BODY MASS INDEX: 23.07 KG/M2

## 2025-01-27 DIAGNOSIS — M79.18 MYOFASCIAL PAIN SYNDROME: ICD-10-CM

## 2025-01-27 PROCEDURE — 76942 ECHO GUIDE FOR BIOPSY: CPT | Performed by: STUDENT IN AN ORGANIZED HEALTH CARE EDUCATION/TRAINING PROGRAM

## 2025-01-27 PROCEDURE — 20553 NJX 1/MLT TRIGGER POINTS 3/>: CPT | Performed by: STUDENT IN AN ORGANIZED HEALTH CARE EDUCATION/TRAINING PROGRAM

## 2025-01-27 RX ORDER — BUPIVACAINE HYDROCHLORIDE 2.5 MG/ML
4 INJECTION, SOLUTION EPIDURAL; INFILTRATION; INTRACAUDAL ONCE
Status: COMPLETED | OUTPATIENT
Start: 2025-01-27 | End: 2025-01-27

## 2025-01-27 RX ORDER — METHYLPREDNISOLONE ACETATE 40 MG/ML
40 INJECTION, SUSPENSION INTRA-ARTICULAR; INTRALESIONAL; INTRAMUSCULAR; SOFT TISSUE ONCE
Status: COMPLETED | OUTPATIENT
Start: 2025-01-27 | End: 2025-01-27

## 2025-01-27 RX ADMIN — BUPIVACAINE HYDROCHLORIDE 4 ML: 2.5 INJECTION, SOLUTION EPIDURAL; INFILTRATION; INTRACAUDAL at 11:04

## 2025-01-27 RX ADMIN — METHYLPREDNISOLONE ACETATE 40 MG: 40 INJECTION, SUSPENSION INTRA-ARTICULAR; INTRALESIONAL; INTRAMUSCULAR; SOFT TISSUE at 11:04

## 2025-01-27 NOTE — PROGRESS NOTES
" Universal Protocol:  procedure performed by consultantConsent: Verbal consent obtained. Written consent obtained.  Risks and benefits: risks, benefits and alternatives were discussed  Consent given by: patient  Time out: Immediately prior to procedure a \"time out\" was called to verify the correct patient, procedure, equipment, support staff and site/side marked as required.  Timeout called at: 1/27/2025 10:50 AM.  Patient understanding: patient states understanding of the procedure being performed  Patient consent: the patient's understanding of the procedure matches consent given  Procedure consent: procedure consent matches procedure scheduled  Relevant documents: relevant documents present and verified  Test results: test results available and properly labeled  Site marked: the operative site was marked  Radiology Images displayed and confirmed. If images not available, report reviewed: imaging studies available  Required items: required blood products, implants, devices, and special equipment available  Patient identity confirmed: verbally with patient  Supporting Documentation  Indications: pain   Procedure Details  Location(s):  Additional procedure details: PROCEDURE NOTE    PATIENT NAME:  Carmen Romero    MEDICAL RECORD NUMBER:  [id number]    YOB: 1980    DATE OF PROCEDURE:  01/27/25    PROCEDURE:  Trigger point injection x 5 with local anesthetic and steroid in the left trapezius, left splenius capitis, right rhomboid muscle groups under ultrasound guidance.   ATTENDING PHYSICIAN:  Maurice Napier M.D.  PREPROCEDURE DIAGNOSIS:  Myofascial pain with identifiable trigger points.  POSTPROCEDURE DIAGNOSIS:  Myofascial pain with identifiable trigger points.  ANESTHESIA:  Local  ESTIMATED BLOOD LOSS:  Minimal  COMPLICATIONS: None  CONSENT:  Today's procedure, its potential benefits as well as its risks and potential side effects were reviewed.  Discussed risks of the procedure include bleeding, " infection, nerve irritation or damage, reactions to the medications, failure of the pain to improve and exacerbation of the pain were explained to the patient, who verbalized understanding and who wished to proceed.  Informed consent was signed.  DESCRIPTION OF THE PROCEDURE:  After informed consent was obtained, the patient was placed in the seated position. 5 trigger points were identified via palpation and marked with a surgical skin marker.  The skin was prepped with antiseptic in the usual sterile fashion.  Strict aseptic technique was utilized throughout the procedure.  Using ultrasound guidance a 25 gauge, 2inch needle was then advanced into each identified trigger point.  Care was taken to visualize the entirety of the needle throughout the injection. An injectate consisting of 4 ml of 0.25% marcaine with 1 mL of 40mg/mL depo-medrol was slowly injected in divided doses after negative aspiration.  The needle was removed with tip intact.  The patient tolerated the procedure and hemostasis was maintained.  There were no apparent paresthesias or complications.  The skin was wiped clean, and a band-aid was placed as appropriate.  The patient was monitored for an appropriate period of time following the procedure and remained hemodynamically stable and neurovascularly intact.  The patient was ultimately discharged to home with supervision in good condition and instructed to call the office to report the response.

## 2025-03-25 ENCOUNTER — PROCEDURE VISIT (OUTPATIENT)
Dept: NEUROLOGY | Facility: CLINIC | Age: 45
End: 2025-03-25
Payer: COMMERCIAL

## 2025-03-25 DIAGNOSIS — M54.12 RADICULOPATHY, CERVICAL REGION: ICD-10-CM

## 2025-03-25 DIAGNOSIS — G95.0 SYRINX OF SPINAL CORD (HCC): ICD-10-CM

## 2025-03-25 PROCEDURE — 95909 NRV CNDJ TST 5-6 STUDIES: CPT | Performed by: PHYSICAL MEDICINE & REHABILITATION

## 2025-03-25 PROCEDURE — 95886 MUSC TEST DONE W/N TEST COMP: CPT | Performed by: PHYSICAL MEDICINE & REHABILITATION

## 2025-04-18 ENCOUNTER — HOSPITAL ENCOUNTER (OUTPATIENT)
Age: 45
Discharge: HOME | End: 2025-04-18
Payer: COMMERCIAL

## 2025-04-18 VITALS — BODY MASS INDEX: 23.07 KG/M2 | HEIGHT: 67 IN | WEIGHT: 147 LBS

## 2025-04-18 DIAGNOSIS — Z12.31 BREAST CANCER SCREENING BY MAMMOGRAM: ICD-10-CM

## 2025-04-18 PROCEDURE — 77063 BREAST TOMOSYNTHESIS BI: CPT

## 2025-04-18 PROCEDURE — 77067 SCR MAMMO BI INCL CAD: CPT

## 2025-04-21 ENCOUNTER — OFFICE VISIT (OUTPATIENT)
Dept: PAIN MEDICINE | Facility: CLINIC | Age: 45
End: 2025-04-21
Payer: COMMERCIAL

## 2025-04-21 ENCOUNTER — PATIENT MESSAGE (OUTPATIENT)
Dept: PAIN MEDICINE | Facility: CLINIC | Age: 45
End: 2025-04-21

## 2025-04-21 VITALS — HEIGHT: 67 IN | BODY MASS INDEX: 23.07 KG/M2 | WEIGHT: 147 LBS

## 2025-04-21 DIAGNOSIS — M79.18 MYOFASCIAL PAIN SYNDROME: ICD-10-CM

## 2025-04-21 DIAGNOSIS — M54.12 CERVICAL RADICULOPATHY: Primary | ICD-10-CM

## 2025-04-21 DIAGNOSIS — G95.0 SYRINX OF SPINAL CORD (HCC): ICD-10-CM

## 2025-04-21 PROCEDURE — 99214 OFFICE O/P EST MOD 30 MIN: CPT | Performed by: STUDENT IN AN ORGANIZED HEALTH CARE EDUCATION/TRAINING PROGRAM

## 2025-04-21 NOTE — PATIENT COMMUNICATION
Pt is scheduled for DXETER with Dr Napier on 5/13/25    Pt is not diabetic    Pt reports taking diclofenac and ibuprofen PRN - was advised on hold information for these meds    Pt given instructions in office and via myc message    Have you completed PT/HEP/Chiro in the past 6 months for dedicated area? Per consult note in Jan 2025, pt completed PT and got excellent relief - also reported seeing chiro with moderate relief - pt has also had TPI and tried meds for pain relief  If yes, how long did you complete?  What was the frequency?  Did it provide relief?  If no, reason therapy was not completed?

## 2025-04-21 NOTE — PATIENT INSTRUCTIONS
"Patient Education     Epidural injection   The Basics   Written by the doctors and editors at [facility]   What is an epidural injection? -- An epidural injection can be used to treat a condition called \"radiculopathy.\" This is the medical term for the pain, weakness, numbness, or tingling that happens when nerves coming from the spinal cord get pinched or damaged.  The doctor injects medicines into the space outside the covering of the spinal cord (figure 1). This is similar to an \"epidural\" that is used for pain relief during labor and childbirth.  Epidural injections can be given into different parts of your back:   Cervical epidural injection - Used to help with pain in the head or arms.   Thoracic epidural injection - Used to help with pain in the upper or middle back.   Lumbar epidural injection - Used to help with pain in the lower back or legs.  How do I prepare for an epidural injection? -- The doctor or nurse will tell you if you need to do anything special to prepare. Before your procedure, your doctor will do an exam. They might send you to get tests, such as:   X-ray, ultrasound, or other imaging tests - Imaging tests create pictures of the inside of the body.  Your doctor will also ask you about your \"health history.\" This involves asking you questions about any health problems you have or had in the past, past surgeries, and any medicines you take. Tell them about:   Any medicines you are taking - This includes any prescription or \"over-the-counter\" medicines you use, plus any herbal supplements you take. It helps to write down and bring a list of any medicines you take, or bring a bag with all of your medicines with you.   Any allergies you have   Any bleeding problems you have - Certain medicines, including some herbs and supplements, can increase the risk of bleeding. Some health conditions also increase this risk.  You will also get information about:   Eating and drinking before your procedure - In " "some cases, you might need to \"fast\" before surgery. This means not eating or drinking anything for a period of time. In other cases, you might be allowed to have liquids until a short time before the procedure. Whether you need to fast, and for how long, depends on the procedure you are having.   What help you will need when you go home - For example, you might need to have someone else bring you home or stay with you for some time while you recover.  Ask the doctor or nurse if you have questions or if there is anything you do not understand.  What happens during an epidural injection? -- When it is time for the procedure:   You might get an \"IV,\" which is a thin tube that goes into a vein. This can be used to give you fluids and medicines.   You will get anesthesia medicines to numb the area where the doctor will give the injection. This is to make sure that you do not feel pain during the procedure. You might also get medicines to make you relax and feel sleepy, called \"sedatives.\"   The doctors and nurses will monitor your breathing, blood pressure, and heart rate during the procedure.   The doctor might use a continuous X-ray called \"fluoroscopy.\" This is to help make sure that the medicines are injected into the right place. The doctor might also inject a dye to see where to give the medicine.   The doctor will place a needle through your skin and inject the medicine into a space near your spine. Then, they will remove the needle and cover the area with a clean bandage.   The procedure takes 15 to 30 minutes.  What happens after an epidural injection? -- After your procedure, the staff will watch you closely for a short time. It might take a few days before you feel the effects of the epidural injection.  Before you go home, make sure that you know what problems to look out for and when to call the doctor. Make sure that you understand your doctor's or nurse's instructions. Ask questions about anything you do " "not understand.  For the rest of the day after your procedure:   Try to rest. Limit activities like exercise or driving.   The doctor might recommend an over-the-counter pain medicine. These include acetaminophen (sample brand name: Tylenol), ibuprofen (sample brand names: Advil, Motrin), and naproxen (sample brand name: Aleve).   Ice can help with pain and swelling. Put a cold gel pack, bag of ice, or bag of frozen vegetables on the injection site area every 1 to 2 hours, for 15 minutes each time. Put a thin towel between the ice (or other cold object) and the skin.  What are the risks of an epidural injection? -- Your doctor will talk to you about all of the possible risks, and answer your questions. Possible risks include:   Bleeding   Infection   Headache   Nerve injury  When should I call the doctor? -- Call for emergency help right away (in the [place] and [place], call 9-1-1) if:   You can't move your arms or legs.  Call for advice if:   You have a fever of 100.4°F (38°C) or higher, or chills.   You have redness or swelling around the injection site.   You have a headache.   Your arms or legs are numb, weak, or tingly.  All topics are updated as new evidence becomes available and our peer review process is complete.  This topic retrieved from [employer] on: May 15, 2024.  Topic 919399 Version 1.0  Release: 32.4.3 - C32.134  © 2024 UpToDate, Inc. and/or its affiliates. All rights reserved.  figure 1: Epidural injection     Duringan epidural injection, the doctor inserts a needle between 2 of the bones thatmake up the spine. Then, they inject medicines into the area around the spinalcord. This is an illustration of a \"lumbar\" epidural injection, which is given into the low back. The doctor can place the needle in other areas to treat other types of pain.  Graphic 233996 Version 1.0  Consumer Information Use and Disclaimer   Disclaimer: This generalized information is a limited summary of diagnosis, treatment, and/or " medication information. It is not meant to be comprehensive and should be used as a tool to help the user understand and/or assess potential diagnostic and treatment options. It does NOT include all information about conditions, treatments, medications, side effects, or risks that may apply to a specific patient. It is not intended to be medical advice or a substitute for the medical advice, diagnosis, or treatment of a health care provider based on the health care provider's examination and assessment of a patient's specific and unique circumstances. Patients must speak with a health care provider for complete information about their health, medical questions, and treatment options, including any risks or benefits regarding use of medications. This information does not endorse any treatments or medications as safe, effective, or approved for treating a specific patient. UpToDate, Inc. and its affiliates disclaim any warranty or liability relating to this information or the use thereof.The use of this information is governed by the Terms of Use, available at https://www.[employer].com/en/know/clinical-effectiveness-terms. 2024© UpToDate, Inc. and its affiliates and/or licensors. All rights reserved.  Copyright   © 2024 UpToDate, Inc. and/or its affiliates. All rights reserved.

## 2025-04-21 NOTE — PROGRESS NOTES
Pain Medicine Follow-Up Note    Assessment:  1. Cervical radiculopathy    2. Syrinx of spinal cord (HCC)    3. Myofascial pain syndrome        Plan:  Orders Placed This Encounter   Procedures    Durable Medical Equipment     1 TENS UNIT, 4 lead    FL spine and pain procedure     Standing Status:   Future     Expected Date:   4/21/2025     Expiration Date:   4/21/2029     Reason for Exam::   Left C7-T1 DEXTER     Anticoagulant hold needed?:   NSAID     Is the patient pregnant?:   Unknown    Ambulatory referral to Neurosurgery     Standing Status:   Future     Expiration Date:   4/21/2026     Referral Priority:   Routine     Referral Type:   Consult - AMB     Referral Reason:   Specialty Services Required     Referred to Provider:   Goldy Ryan MD     Requested Specialty:   Neurosurgery     Number of Visits Requested:   1     Expiration Date:   4/21/2026       No orders of the defined types were placed in this encounter.      My impressions and treatment recommendations were discussed in detail with the patient who verbalized understanding and had no further questions.      44-year-old female returns for office ongoing neck and left arm pain.  Status post TPI with minimal improvement.  Status post MRI of the cervical spine showing left disc herniation at C5-6 with EMG showing chronic C6 radiculopathy on the left.  There is no cord expansion on MRI and hyperintensity at the C4 level is stable from her last study in 2021.  Her symptoms may be secondary to cervical radiculopathy, despite not clear dermatomal pattern down LUE.    She still has a strong component of myofascial pain in the left trapezius region with limited abduction of the left shoulder. TPI was very painful for her and she does not want to repeat. I will order a TENS unit for her.    We discussed trial of cervical epidural steroid injection fluoroscopic guidance to help with her presumed radicular symptoms. She is a bit apprehensive about this as  it is not a definitive treatment. FELICITY sidhu have her see neurosurgery to discuss herniated disc and atypical radicular symptoms.     [place] Prescription Drug Monitoring Program report was reviewed and was appropriate         Complete risks and benefits including bleeding, infection, tissue reaction, nerve injury and allergic reaction were discussed. The approach was demonstrated using models and literature was provided. Verbal and written consent was obtained.      ischarge instructions were provided. I personally saw and examined the patient and I agree with the above discussed plan of care.    History of Present Illness:    Carmen Romero is a 44 y.o. female who presents to [facility] for interval re-evaluation of the above stated pain complaints. The patient has a past medical and chronic pain history as outlined in the assessment section. She was last seen on 1/27/2025 for trigger point injection with only 2 weeks of relief.  Returns today with 3 out of 10 pain again in the neck and down into the left arm.  Worse in the evening.  Burning, sharp, pressure-like, shooting nature.    Recently underwent EMG of left upper extremity showing C6 radiculopathy.  No ulnar nerve involvement..    Other than as stated above, the patient denies any interval changes in medications, medical condition, mental condition, symptoms, or allergies since the last office visit.         Review of Systems:    Review of Systems   Musculoskeletal:  Positive for joint swelling, neck pain and neck stiffness.        DROM     Neurological:  Positive for weakness and numbness.         Past Medical History:   Diagnosis Date    Gestational diabetes     Kidney stone        History reviewed. No pertinent surgical history.    Family History   Problem Relation Age of Onset    Breast cancer Mother 64    Diabetes Father     Brain cancer Sister     No Known Problems Daughter     Lung cancer Maternal Grandfather     Diabetes  "Paternal Grandmother     Breast cancer Maternal Aunt 50    Cancer Paternal Aunt     Cancer Paternal Aunt        Social History     Occupational History    Not on file   Tobacco Use    Smoking status: Never    Smokeless tobacco: Never   Vaping Use    Vaping status: Never Used   Substance and Sexual Activity    Alcohol use: Yes     Comment: socially     Drug use: Never    Sexual activity: Yes     Partners: Male     Birth control/protection: Male Sterilization         Current Outpatient Medications:     cyclobenzaprine (FLEXERIL) 10 mg tablet, Take 1 tablet (10 mg total) by mouth 2 (two) times a day as needed for muscle spasms, Disp: 60 tablet, Rfl: 0    diclofenac (VOLTAREN) 75 mg EC tablet, Take 1 tablet (75 mg total) by mouth 2 (two) times a day, Disp: 60 tablet, Rfl: 1    ibuprofen (MOTRIN) 800 mg tablet, Take 800 mg by mouth every 6 (six) hours as needed for mild pain, Disp: , Rfl:     Tart Cherry 1200 MG CAPS, Take by mouth, Disp: , Rfl:     Turmeric 5-1000 MG CAPS, Take 1,000 mg by mouth 2 (two) times a day, Disp: , Rfl:     No Known Allergies    Physical Exam:    Ht 5' 7\" (1.702 m)   Wt 66.7 kg (147 lb)   LMP 04/11/2025 (Exact Date)   BMI 23.02 kg/m²     Constitutional:normal, well developed, well nourished, alert, in no distress and non-toxic and no overt pain behavior.  Eyes:anicteric  HEENT:grossly intact  Neck:supple, symmetric, trachea midline and no masses   Pulmonary:even and unlabored  Cardiovascular:No edema or pitting edema present  Skin:Normal without rashes or lesions and well hydrated  Psychiatric:Mood and affect appropriate  Neurologic:Cranial Nerves II-XII grossly intact  Musculoskeletal:normal      Imaging  FL spine and pain procedure    (Results Pending)         Orders Placed This Encounter   Procedures    Durable Medical Equipment    FL spine and pain procedure    Ambulatory referral to Neurosurgery     "

## 2025-04-21 NOTE — H&P (VIEW-ONLY)
Pain Medicine Follow-Up Note    Assessment:  1. Cervical radiculopathy    2. Syrinx of spinal cord (HCC)    3. Myofascial pain syndrome        Plan:  Orders Placed This Encounter   Procedures    Durable Medical Equipment     1 TENS UNIT, 4 lead    FL spine and pain procedure     Standing Status:   Future     Expected Date:   4/21/2025     Expiration Date:   4/21/2029     Reason for Exam::   Left C7-T1 DEXTER     Anticoagulant hold needed?:   NSAID     Is the patient pregnant?:   Unknown    Ambulatory referral to Neurosurgery     Standing Status:   Future     Expiration Date:   4/21/2026     Referral Priority:   Routine     Referral Type:   Consult - AMB     Referral Reason:   Specialty Services Required     Referred to Provider:   Goldy Ryan MD     Requested Specialty:   Neurosurgery     Number of Visits Requested:   1     Expiration Date:   4/21/2026       No orders of the defined types were placed in this encounter.      My impressions and treatment recommendations were discussed in detail with the patient who verbalized understanding and had no further questions.      44-year-old female returns for office ongoing neck and left arm pain.  Status post TPI with minimal improvement.  Status post MRI of the cervical spine showing left disc herniation at C5-6 with EMG showing chronic C6 radiculopathy on the left.  There is no cord expansion on MRI and hyperintensity at the C4 level is stable from her last study in 2021.  Her symptoms may be secondary to cervical radiculopathy, despite not clear dermatomal pattern down LUE.    She still has a strong component of myofascial pain in the left trapezius region with limited abduction of the left shoulder. TPI was very painful for her and she does not want to repeat. I will order a TENS unit for her.    We discussed trial of cervical epidural steroid injection fluoroscopic guidance to help with her presumed radicular symptoms. She is a bit apprehensive about this as  it is not a definitive treatment. FELICITY sidhu have her see neurosurgery to discuss herniated disc and atypical radicular symptoms.     [place] Prescription Drug Monitoring Program report was reviewed and was appropriate         Complete risks and benefits including bleeding, infection, tissue reaction, nerve injury and allergic reaction were discussed. The approach was demonstrated using models and literature was provided. Verbal and written consent was obtained.      ischarge instructions were provided. I personally saw and examined the patient and I agree with the above discussed plan of care.    History of Present Illness:    Carmen Romero is a 44 y.o. female who presents to [facility] for interval re-evaluation of the above stated pain complaints. The patient has a past medical and chronic pain history as outlined in the assessment section. She was last seen on 1/27/2025 for trigger point injection with only 2 weeks of relief.  Returns today with 3 out of 10 pain again in the neck and down into the left arm.  Worse in the evening.  Burning, sharp, pressure-like, shooting nature.    Recently underwent EMG of left upper extremity showing C6 radiculopathy.  No ulnar nerve involvement..    Other than as stated above, the patient denies any interval changes in medications, medical condition, mental condition, symptoms, or allergies since the last office visit.         Review of Systems:    Review of Systems   Musculoskeletal:  Positive for joint swelling, neck pain and neck stiffness.        DROM     Neurological:  Positive for weakness and numbness.         Past Medical History:   Diagnosis Date    Gestational diabetes     Kidney stone        History reviewed. No pertinent surgical history.    Family History   Problem Relation Age of Onset    Breast cancer Mother 64    Diabetes Father     Brain cancer Sister     No Known Problems Daughter     Lung cancer Maternal Grandfather     Diabetes  "Paternal Grandmother     Breast cancer Maternal Aunt 50    Cancer Paternal Aunt     Cancer Paternal Aunt        Social History     Occupational History    Not on file   Tobacco Use    Smoking status: Never    Smokeless tobacco: Never   Vaping Use    Vaping status: Never Used   Substance and Sexual Activity    Alcohol use: Yes     Comment: socially     Drug use: Never    Sexual activity: Yes     Partners: Male     Birth control/protection: Male Sterilization         Current Outpatient Medications:     cyclobenzaprine (FLEXERIL) 10 mg tablet, Take 1 tablet (10 mg total) by mouth 2 (two) times a day as needed for muscle spasms, Disp: 60 tablet, Rfl: 0    diclofenac (VOLTAREN) 75 mg EC tablet, Take 1 tablet (75 mg total) by mouth 2 (two) times a day, Disp: 60 tablet, Rfl: 1    ibuprofen (MOTRIN) 800 mg tablet, Take 800 mg by mouth every 6 (six) hours as needed for mild pain, Disp: , Rfl:     Tart Cherry 1200 MG CAPS, Take by mouth, Disp: , Rfl:     Turmeric 5-1000 MG CAPS, Take 1,000 mg by mouth 2 (two) times a day, Disp: , Rfl:     No Known Allergies    Physical Exam:    Ht 5' 7\" (1.702 m)   Wt 66.7 kg (147 lb)   LMP 04/11/2025 (Exact Date)   BMI 23.02 kg/m²     Constitutional:normal, well developed, well nourished, alert, in no distress and non-toxic and no overt pain behavior.  Eyes:anicteric  HEENT:grossly intact  Neck:supple, symmetric, trachea midline and no masses   Pulmonary:even and unlabored  Cardiovascular:No edema or pitting edema present  Skin:Normal without rashes or lesions and well hydrated  Psychiatric:Mood and affect appropriate  Neurologic:Cranial Nerves II-XII grossly intact  Musculoskeletal:normal      Imaging  FL spine and pain procedure    (Results Pending)         Orders Placed This Encounter   Procedures    Durable Medical Equipment    FL spine and pain procedure    Ambulatory referral to Neurosurgery     "

## 2025-04-28 ENCOUNTER — HOSPITAL ENCOUNTER (OUTPATIENT)
Dept: CT IMAGING | Facility: HOSPITAL | Age: 45
Discharge: HOME | End: 2025-04-28
Attending: ORTHOPAEDIC SURGERY
Payer: COMMERCIAL

## 2025-04-28 DIAGNOSIS — M48.20: ICD-10-CM

## 2025-04-28 PROCEDURE — 72125 CT NECK SPINE W/O DYE: CPT

## 2025-05-02 ENCOUNTER — RESULTS FOLLOW-UP (OUTPATIENT)
Dept: FAMILY MEDICINE CLINIC | Facility: CLINIC | Age: 45
End: 2025-05-02

## 2025-05-02 NOTE — RESULT ENCOUNTER NOTE
Negative mammogram, follow up with routine screening in 1 year.     DO Sarah Oseiroe [facility]  10/7/2024 2:30 PM

## 2025-05-05 ENCOUNTER — EVALUATION (OUTPATIENT)
Dept: PHYSICAL THERAPY | Facility: CLINIC | Age: 45
End: 2025-05-05
Payer: COMMERCIAL

## 2025-05-05 DIAGNOSIS — M54.12 CERVICAL RADICULOPATHY: Primary | ICD-10-CM

## 2025-05-05 PROCEDURE — 97110 THERAPEUTIC EXERCISES: CPT

## 2025-05-05 PROCEDURE — 97161 PT EVAL LOW COMPLEX 20 MIN: CPT

## 2025-05-05 NOTE — PROGRESS NOTES
PT Evaluation     Today's date: 2025  Patient name: Carmen Romero  : 1980  MRN: [id number]  Referring provider: Esteban Dacosta MD  Dx: No diagnosis found.    Start Time: 0800  Stop Time: 0900  Total time in clinic (min): 60 minutes    Assessment  Impairments: abnormal or restricted ROM, activity intolerance, impaired physical strength, lacks appropriate home exercise program, pain with function and unable to perform ADL  Symptom irritability: high    Assessment details: Pt is a 45 yo F presenting to the physical therapy clinic with c/c of L sided neck pain with pain and N/T into the L UE and hand. Pt presents with no red flags at this time. Physical examination reveals slightly limited AROM of the C/S, significantly limited abduction AROM of the L shoulder, (+) median and ulnar nerve tension tests, LUE myotomal weakness, and TTP of the c/s musculature. Pt presents with s/s consistent with cervical radiculopathy. Present impairments limit the pt's ability to perform her ADLs and recreational activities w/o pain and compensations. Skilled PT is medically necessary to address impairments and improve pt's QOL.  Understanding of Dx/Px/POC: good     Prognosis: fair    Goals  STG 6 weeks  Pt will increase strength by 1/2 grade where limited  Pt will be able to tolerate the 4th position of the median nerve tension test w/o increased neck/shoulder/hand pain  Pt will be able to abduct L shoulder to 120* w/o pain    LTG 12 weeks  Pt will improve strength by 1 grade where limited  Pt will be able to tolerate the 5th position of the median nerve tension test w/o increased neck/shoulder/hand pain  Pt will be able to participate in yoga w/o residual symptoms ~2 days later      Plan  Patient would benefit from: skilled physical therapy  Planned modality interventions: cryotherapy and thermotherapy: hydrocollator packs    Planned therapy interventions: home exercise program, neuromuscular re-education, postural  training, strengthening, stretching, therapeutic activities, therapeutic exercise and joint mobilization    Frequency: 2x week  Duration in weeks: 12  Plan of Care beginning date: 5/5/2025  Plan of Care expiration date: 7/28/2025  Treatment plan discussed with: patient        Subjective Evaluation    History of Present Illness  Mechanism of injury: Pt is a 43 yo F presenting to the physical therapy clinic with c/c of L sided neck pain with pain and N/T into the L UE and hand. Pt notes history of LUE/hand pain that has been present for some time with recent worsening over the last two years. Pt reports multiple stints of PT treating different aspects of her neck/hand/arm pain. She notes PT has helped in the past, but provides relief for only a few weeks at a time. She notes difficultly using the UE 2/2 weakness, noting it is hard to hold objects in the hand. She also has trouble turning her neck to the left to check her blind spot while driving. She would like to avoid surgical intervention if possible.     Quality of life: good    Patient Goals  Patient goals for therapy: independence with ADLs/IADLs, decreased pain, increased strength and return to sport/leisure activities    Pain  Quality: burning, radiating, pressure and squeezing  Relieving factors: rest  Aggravating factors: lifting and overhead activity    Hand dominance: right      Diagnostic Tests  X-ray: abnormal  MRI studies: abnormal  EMG: abnormal        Objective     Neurological Testing     Sensation   Cervical/Thoracic   Left   Intact: light touch, pin prick and sharp/dull discrimination    Right   Intact: light touch, pin prick and sharp/dull discrimination    Active Range of Motion   Cervical/Thoracic Spine       Cervical    Flexion:  WFL  Extension:  with pain Restriction level: minimal  Left lateral flexion:  with pain Restriction level: moderate  Right lateral flexion:  WFL  Left rotation:  WFL  Right rotation:  WFL  Left Shoulder   Flexion:  WFL  Abduction: 90 degrees with pain    Right Shoulder   Flexion: WFL  Abduction: WFL    Additional Active Range of Motion Details  Pulling in L side neck during abduction    Strength/Myotome Testing     Left Shoulder     Planes of Motion   Flexion: 5   Abduction: 4     Right Shoulder     Planes of Motion   Flexion: 5   Abduction: 5     Left Elbow   Flexion: 4  Extension: 4    Right Elbow   Flexion: 5  Extension: 5    Left Wrist/Hand   Wrist extension: 4  Wrist flexion: 4  Thumb extension: 4    Right Wrist/Hand   Wrist extension: 5  Wrist flexion: 5  Thumb extension: 5    Tests   Cervical   Positive vertical compression and cervical distraction test.  Negative repeated extension and repeated flexion.     Left   Positive Spurling's Test A and Spurling's Test B.     Left Shoulder   Positive ULTT1 and ULTT4.     Lumbar   Positive vertical compression .              Precautions: NA  POC expires Unit limit Auth Expiration date PT/OT/ST + Visit Limit?   7/28/25 4 NA 90                           Visit/Unit Tracking  AUTH Status:  Date 5/5              NA Used 1               Remaining  89                    Manuals 5/5            SOR SG                                                   Neuro Re-Ed             Median nerve glide HEP                                                                                          Ther Ex             Pt edu SG            Self traction HEP            Self SOR HEP                                                                             Ther Activity                                       Gait Training                                       Modalities

## 2025-05-08 ENCOUNTER — OFFICE VISIT (OUTPATIENT)
Dept: NEUROSURGERY | Facility: CLINIC | Age: 45
End: 2025-05-08
Attending: STUDENT IN AN ORGANIZED HEALTH CARE EDUCATION/TRAINING PROGRAM
Payer: COMMERCIAL

## 2025-05-08 VITALS
TEMPERATURE: 97.5 F | SYSTOLIC BLOOD PRESSURE: 118 MMHG | OXYGEN SATURATION: 98 % | HEART RATE: 99 BPM | DIASTOLIC BLOOD PRESSURE: 70 MMHG

## 2025-05-08 DIAGNOSIS — M54.12 CERVICAL RADICULOPATHY: ICD-10-CM

## 2025-05-08 DIAGNOSIS — M54.12 RADICULOPATHY, CERVICAL: ICD-10-CM

## 2025-05-08 DIAGNOSIS — G95.0 SYRINX OF SPINAL CORD (HCC): ICD-10-CM

## 2025-05-08 PROCEDURE — 99204 OFFICE O/P NEW MOD 45 MIN: CPT | Performed by: NURSE PRACTITIONER

## 2025-05-08 NOTE — ASSESSMENT & PLAN NOTE
Syrinx of spinal cord  Discovered incidentally in 2017 on workup for cervical/shoulder pain.  Has demonstrated near-resolution in updated imaging.    Orders:    Ambulatory referral to Neurosurgery

## 2025-05-08 NOTE — ASSESSMENT & PLAN NOTE
Presents for evaluation of left cervical radiculopathy  Ongoing since ski accident in 2017.  Symptoms worsening since January 2024 to include significant to left neck, shoulder and radiating down arm with associate numbness and cold to left hand/fingers.  Has tried multiple management methods over the year including PT, chiropractor, trigger point injections.  Scheduled for YARELIS next week.  Has also been told by orthopedist that she has carpal tunnel and cubital tunnel as well as left elbow arthritis.  Takes ibuprofen 800 mg PRN for severe pain with good relief as well as occasional muscle relaxer to sleep.  On exam, motor strength is intact. She has bilateral Tee's, L > R. Brisk reflexes throughout. Tenderness to left paraspinal and trapezius areas.    Imaging:  CT cervical spine wo, 4/28/25: Disc height loss with endplate osteophytosis at C4-5 and C5-6, most pronounced at C5-6. No critical canal stenosis. Mild left foraminal narrowing at C5-6.   MRI cervical spine wo, 1/17/25: 1. Top normal prominence of the central canal is stable. No cord expansion. 2. Mild spondylosis. 3. No cord compression. Left subarticular zone disc herniation, protrusion type. Mild narrowing left subarticular zone. Central canal and right neural foramen patent. This level is similar to the prior study.   LUE EMG 1/13/25: chronic C6 radiculitis.    Plan:  Reviewed imaging extensively with patient.  Extensively discussed her options for management including surgical intervention vs ongoing conservative management.  I have advised that she may benefit from C5-6 anterior decompression vs arthroplasty.   She was evaluated at [facility] by Dr. Dacosta for same 5/2 and reportedly had cervical flexion/extension x-rays completed there - I do not have access to these so cannot make any determination regarding need for fusion.  She has upcoming appointments with orthopedist to discuss possible cubital tunnel syndrome and with pain management for cervical  YARELIS - I have encouraged her to follow up as scheduled for these.  She would like to take time to consider her options. If she elects to pursue surgery she should follow up with a surgeon at next appointment.   Will request cervical x-rays to be pushed over.

## 2025-05-08 NOTE — PROGRESS NOTES
Name: Carmen Romero      : 1980      MRN: [id number]  Encounter Provider: KALIA Blanca  Encounter Date: 2025   Encounter department: [facility]  :  Assessment & Plan  Cervical radiculopathy    Orders:    Ambulatory referral to Neurosurgery    Syrinx of spinal cord (HCC)  Syrinx of spinal cord  Discovered incidentally in 2017 on workup for cervical/shoulder pain.  Has demonstrated near-resolution in updated imaging.    Orders:    Ambulatory referral to Neurosurgery    Radiculopathy, cervical  Presents for evaluation of left cervical radiculopathy  Ongoing since ski accident in .  Symptoms worsening since 2024 to include significant to left neck, shoulder and radiating down arm with associate numbness and cold to left hand/fingers.  Has tried multiple management methods over the year including PT, chiropractor, trigger point injections.  Scheduled for YARELIS next week.  Has also been told by orthopedist that she has carpal tunnel and cubital tunnel as well as left elbow arthritis.  Takes ibuprofen 800 mg PRN for severe pain with good relief as well as occasional muscle relaxer to sleep.  On exam, motor strength is intact. She has bilateral Tee's, L > R. Brisk reflexes throughout. Tenderness to left paraspinal and trapezius areas.    Imaging:  CT cervical spine wo, 25: Disc height loss with endplate osteophytosis at C4-5 and C5-6, most pronounced at C5-6. No critical canal stenosis. Mild left foraminal narrowing at C5-6.   MRI cervical spine wo, 25: 1. Top normal prominence of the central canal is stable. No cord expansion. 2. Mild spondylosis. 3. No cord compression. Left subarticular zone disc herniation, protrusion type. Mild narrowing left subarticular zone. Central canal and right neural foramen patent. This level is similar to the prior study.   LUE EMG 25: chronic C6 radiculitis.    Plan:  Reviewed imaging extensively with  patient.  Extensively discussed her options for management including surgical intervention vs ongoing conservative management.  I have advised that she may benefit from C5-6 anterior decompression vs arthroplasty.   She was evaluated at [facility] by Dr. Dacosta for same 5/2 and reportedly had cervical flexion/extension x-rays completed there - I do not have access to these so cannot make any determination regarding need for fusion.  She has upcoming appointments with orthopedist to discuss possible cubital tunnel syndrome and with pain management for cervical YARELIS - I have encouraged her to follow up as scheduled for these.  She would like to take time to consider her options. If she elects to pursue surgery she should follow up with a surgeon at next appointment.   Will request cervical x-rays to be pushed over.               History of Present Illness     Carmen Romero is a 44 y.o. female     Without significant past medical history who presents as referral from pain management for evaluation of left cervical radiculopathy.  She relates that symptoms began in 2017 after she suffered a skiing accident.  Since that time, she has been experiencing some degree of left-sided shoulder pain neck pain.  In January 2024 she began experiencing a cold sensation to her left hand as well as numbness to her fingers at times especially when she abducts her shoulder.  She is gone through multiple sessions of physical therapy with a private therapist and has been evaluated by orthopedics as well as pain management.  Most recently she had trigger point injections to her neck that helped somewhat.  She will take ibuprofen as needed and an occasional muscle relaxer so that she can sleep at night.    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  She is also been told previously that she has carpal tunnel syndrome although EMG completed in March 2025 confirmed chronic C6 radiculopathy.   She was also told that her arthritis could have worsened due to undiagnosed Lyme disease.  She is right-hand dominant but has become somewhat ambidextrous since her injury.  She is active and exercises almost daily.  She does have difficulty with dexterous movement in her left hand.  She was evaluated by Dr. Dacosta last week for surgical opinion.    She is otherwise in good health.  She lives at home with her significant other as well as a 15-year-old son and a 5-year-old daughter.         Review of Systems   Constitutional: Negative.    HENT: Negative.     Eyes: Negative.    Respiratory: Negative.     Cardiovascular: Negative.    Gastrointestinal: Negative.    Endocrine: Negative.    Genitourinary: Negative.    Musculoskeletal:  Positive for neck pain and neck stiffness. Negative for myalgias.        NewPt- 2nd opinion Dr. Dacosta 4/22/25  Cervical radiculopathy  MRI Cspine SL 1/17/25  CT Cspine SL 4/28/25     EMG LUE SL 1/13/25  PT- starting 5/5/25 w/SL  PM- Left C7-T1 DEXTER on 5/13/25      4-8/10 (depending on medications) radiates into LUE into all fingers  N/T/W in left hand/fingers- drops items     No ac/Non smoker/No previous neck sx    Skin: Negative.    Allergic/Immunologic: Negative.    Neurological:  Positive for weakness, numbness and headaches.   Hematological: Negative.    Psychiatric/Behavioral:  Positive for sleep disturbance (due to pain).    All other systems reviewed and are negative.    I have personally reviewed the MA's review of systems and made changes as necessary.    Past Medical History   Past Medical History:   Diagnosis Date    Gestational diabetes     Kidney stone      History reviewed. No pertinent surgical history.  Family History   Problem Relation Age of Onset    Breast cancer Mother 64    Diabetes Father     Brain cancer Sister     No Known Problems Daughter     Lung cancer Maternal Grandfather     Diabetes Paternal Grandmother     Breast cancer Maternal Aunt 50    Cancer Paternal  Aunt     Cancer Paternal Aunt      she reports that she has never smoked. She has never used smokeless tobacco. She reports current alcohol use. She reports that she does not use drugs.  Current Outpatient Medications   Medication Instructions    cyclobenzaprine (FLEXERIL) 10 mg, Oral, 2 times daily PRN    diclofenac (VOLTAREN) 75 mg, Oral, 2 times daily    ibuprofen (MOTRIN) 800 mg, Every 6 hours PRN    Tart Vivas 1200 MG CAPS Take by mouth    Turmeric 5-1000 MG CAPS 1,000 mg, 2 times daily   No Known Allergies   Objective   /70 (Patient Position: Sitting, Cuff Size: Standard)   Pulse 99   Temp 97.5 °F (36.4 °C) (Temporal)   LMP 04/11/2025 (Exact Date)   SpO2 98%     Physical Exam  Constitutional:       Appearance: She is well-developed.   HENT:      Head: Normocephalic and atraumatic.   Eyes:      General: Lids are normal.      Extraocular Movements: Extraocular movements intact.      Pupils: Pupils are equal, round, and reactive to light.   Pulmonary:      Effort: Pulmonary effort is normal.   Abdominal:      Palpations: Abdomen is soft.   Musculoskeletal:         General: Normal range of motion.      Cervical back: Normal range of motion. Tenderness present.   Skin:     General: Skin is warm and dry.   Neurological:      Mental Status: She is alert and oriented to person, place, and time.      Sensory: No sensory deficit.      Motor: Motor strength is normal.No weakness.      Deep Tendon Reflexes: Reflexes abnormal.      Reflex Scores:       Tricep reflexes are 3+ on the right side and 3+ on the left side.       Bicep reflexes are 3+ on the right side and 3+ on the left side.       Brachioradialis reflexes are 3+ on the right side and 3+ on the left side.       Patellar reflexes are 3+ on the right side and 3+ on the left side.       Achilles reflexes are 3+ on the right side and 3+ on the left side.      Neurological Exam  Mental Status  Alert. Oriented to person, place, and time.    Cranial  Nerves  CN II: Visual acuity is normal. Visual fields full to confrontation.  CN III, IV, VI: Extraocular movements intact bilaterally. Normal lids and orbits bilaterally. Pupils equal round and reactive to light bilaterally.  CN V: Facial sensation is normal.  CN VII: Full and symmetric facial movement.  CN VIII: Hearing is normal.  CN IX, X: Palate elevates symmetrically. Normal gag reflex.  CN XI: Shoulder shrug strength is normal.  CN XII: Tongue midline without atrophy or fasciculations.    Motor   Strength is 5/5 throughout all four extremities.    Sensory  Cold sensation to left hand with numbness when abducts left shoulder.    Reflexes                                            Right                      Left  Brachioradialis                    3+                         3+  Biceps                                 3+                         3+  Triceps                                3+                         3+  Patellar                                3+                         3+  Achilles                                3+                         3+    Right pathological reflexes: Bo's present. Ankle clonus absent.  Left pathological reflexes: Bo's present. Ankle clonus absent.      Radiology Results Review: I personally reviewed the following image studies in PACS and associated radiology reports: CT C-spine and MRI spine. My interpretation of the radiology images/reports is: as above.    Administrative Statements   I have spent a total time of 50 minutes in caring for this patient on the day of the visit/encounter including Diagnostic results, Risks and benefits of tx options, Instructions for management, Patient and family education, Importance of tx compliance, Risk factor reductions, Impressions, Counseling / Coordination of care, Documenting in the medical record, Reviewing/placing orders in the medical record (including tests, medications, and/or procedures), and Obtaining or reviewing  history  .

## 2025-05-12 ENCOUNTER — OFFICE VISIT (OUTPATIENT)
Dept: PHYSICAL THERAPY | Facility: CLINIC | Age: 45
End: 2025-05-12
Payer: COMMERCIAL

## 2025-05-12 DIAGNOSIS — M54.12 CERVICAL RADICULOPATHY: Primary | ICD-10-CM

## 2025-05-12 PROCEDURE — 97012 MECHANICAL TRACTION THERAPY: CPT

## 2025-05-12 PROCEDURE — 97140 MANUAL THERAPY 1/> REGIONS: CPT

## 2025-05-12 PROCEDURE — 97110 THERAPEUTIC EXERCISES: CPT

## 2025-05-12 NOTE — PROGRESS NOTES
Daily Note     Today's date: 2025  Patient name: Carmen Romero  : 1980  MRN: [id number]  Referring provider: Esteban Dacosta MD  Dx:   Encounter Diagnosis     ICD-10-CM    1. Cervical radiculopathy  M54.12           Start Time: 08  Stop Time: 0900  Total time in clinic (min): 43 minutes    Subjective: Pt notes that she has been able to lift her arm slightly since last visit. She saw another doctor for a second opinion who also suggested she receive surgical intervention for her neck.       Objective: See treatment diary below      Assessment: Tolerated treatment well, demonstrating improved L shoulder abduction AROM following c/s traction, c/s MT, and median n glide. Educated pt on continuing with HEP and monitor symptoms to assess for treatment carryover. Patient would benefit from continued PT      Plan: Progress treatment as tolerated.       Precautions: NA  POC expires Unit limit Auth Expiration date PT/OT/ST + Visit Limit?   25 4 NA 90                           Visit/Unit Tracking  AUTH Status:  Date              NA Used 1 2              Remaining  89 88                   Manuals            SOR SG SG           Median n glide  SG           L side C/S opening mobs  SG                        Neuro Re-Ed             Median nerve glide HEP                                                                                          Ther Ex             Pt edu SG SG           Self traction HEP            Self SOR HEP                                                                             Ther Activity                                       Gait Training                                       Modalities             C/S traction  12'  20#

## 2025-05-13 ENCOUNTER — HOSPITAL ENCOUNTER (OUTPATIENT)
Dept: RADIOLOGY | Facility: CLINIC | Age: 45
Discharge: HOME | End: 2025-05-13
Attending: STUDENT IN AN ORGANIZED HEALTH CARE EDUCATION/TRAINING PROGRAM
Payer: COMMERCIAL

## 2025-05-13 ENCOUNTER — TELEPHONE (OUTPATIENT)
Dept: NEUROSURGERY | Facility: CLINIC | Age: 45
End: 2025-05-13

## 2025-05-13 VITALS
OXYGEN SATURATION: 99 % | HEART RATE: 77 BPM | RESPIRATION RATE: 20 BRPM | SYSTOLIC BLOOD PRESSURE: 110 MMHG | DIASTOLIC BLOOD PRESSURE: 70 MMHG

## 2025-05-13 DIAGNOSIS — M54.12 CERVICAL RADICULOPATHY: ICD-10-CM

## 2025-05-13 PROCEDURE — 62321 NJX INTERLAMINAR CRV/THRC: CPT | Performed by: STUDENT IN AN ORGANIZED HEALTH CARE EDUCATION/TRAINING PROGRAM

## 2025-05-13 RX ORDER — METHYLPREDNISOLONE ACETATE 80 MG/ML
80 INJECTION, SUSPENSION INTRA-ARTICULAR; INTRALESIONAL; INTRAMUSCULAR; PARENTERAL; SOFT TISSUE ONCE
Status: COMPLETED | OUTPATIENT
Start: 2025-05-13 | End: 2025-05-13

## 2025-05-13 RX ADMIN — METHYLPREDNISOLONE ACETATE 80 MG: 80 INJECTION, SUSPENSION INTRA-ARTICULAR; INTRALESIONAL; INTRAMUSCULAR; SOFT TISSUE at 08:23

## 2025-05-13 RX ADMIN — IOHEXOL 1 ML: 300 INJECTION, SOLUTION INTRAVENOUS at 08:22

## 2025-05-13 NOTE — TELEPHONE ENCOUNTER
5/13/25 Received and uploaded disc and report to patients mychart from [facility] sx  XR Cspine from 4/22/25. Disc and report mailed to patient to keep for her records. SE

## 2025-05-13 NOTE — INTERVAL H&P NOTE
Update: (This section must be completed if the H&P was completed greater than 24 hrs to procedure or admission)    H&P reviewed. After examining the patient, I find no changed to the H&P since it had been written.    Patient re-evaluated. Accept as history and physical.    Maurice Napier MD/May 13, 2025/8:11 AM

## 2025-05-13 NOTE — DISCHARGE INSTR - LAB
Epidural Steroid Injection   WHAT YOU NEED TO KNOW:   An epidural steroid injection (YARELIS) is a procedure to inject steroid medicine into the epidural space. The epidural space is between your spinal cord and vertebrae. Steroids reduce inflammation and fluid buildup in your spine that may be causing pain. You may be given pain medicine along with the steroids.          ACTIVITY  Do not drive or operate machinery today.  No strenuous activity today - bending, lifting, etc.  You may resume normal activites starting tomorrow - start slowly and as tolerated.  You may shower today, but no tub baths or hot tubs.  You may have numbness for several hours from the local anesthetic. Please use caution and common sense, especially with weight-bearing activities.    CARE OF THE INJECTION SITE  If you have soreness or pain, apply ice to the area today (20 minutes on/20 minutes off).  Starting tomorrow, you may use warm, moist heat or ice if needed.  You may have an increase or change in your discomfort for 36-48 hours after your treatment.  Apply ice and continue with any pain medication you have been prescribed.  Notify the [facility] if you have any of the following: redness, drainage, swelling, headache, stiff neck or fever above 100°F.    SPECIAL INSTRUCTIONS  Our office will contact you in approximately 14 days for a progress report.    MEDICATIONS  Continue to take all routine medications.  Our office may have instructed you to hold some medications.    As no general anesthesia was used in today's procedure, you should not experience any side effects related to anesthesia.     If you are diabetic, the steroids used in today's injection may temporarily increase your blood sugar levels after the first few days after your injection. Please keep a close eye on your sugars and alert the doctor who manages your diabetes if your sugars are significantly high from your baseline or you are symptomatic.     If you have a  problem specifically related to your procedure, please call our office at [phone].  Problems not related to your procedure should be directed to your primary care physician.

## 2025-05-14 ENCOUNTER — OFFICE VISIT (OUTPATIENT)
Dept: OBGYN CLINIC | Facility: CLINIC | Age: 45
End: 2025-05-14
Payer: COMMERCIAL

## 2025-05-14 VITALS — HEIGHT: 67 IN | WEIGHT: 145 LBS | BODY MASS INDEX: 22.76 KG/M2

## 2025-05-14 DIAGNOSIS — M54.12 RADICULOPATHY, CERVICAL: ICD-10-CM

## 2025-05-14 DIAGNOSIS — G56.22 ULNAR NEURITIS, LEFT: Primary | ICD-10-CM

## 2025-05-14 DIAGNOSIS — G56.02 CARPAL TUNNEL SYNDROME ON LEFT: ICD-10-CM

## 2025-05-14 PROCEDURE — 99203 OFFICE O/P NEW LOW 30 MIN: CPT | Performed by: SURGERY

## 2025-05-14 NOTE — ASSESSMENT & PLAN NOTE
EMG, MRI, CT, and XR of the C-spine shows some degenerative changes with radicular nerve changes   Management per neuro and pain management

## 2025-05-14 NOTE — PROGRESS NOTES
"Kamla Hoyt M.D.  Attending, Orthopaedic Surgery  Hand, Wrist, and Elbow Surgery  [facility]      ORTHOPAEDIC HAND, WRIST, AND ELBOW OFFICE  VISIT       ASSESSMENT/PLAN:        Assessment & Plan      ***    The patient verbalized understanding of exam findings and treatment plan. We engaged in the shared decision-making process and treatment options were discussed at length with the patient. Surgical and conservative management discussed today along with risks and benefits.    There are no diagnoses linked to this encounter.    Follow Up:  No follow-ups on file.    To Do Next Visit:  {To do next visit:97532::\"Re-evaluation of current issue\"}      General Discussions:  {[place] Non-Operative Discussions:06555}    Operative Discussions:  {[place] Surgical Discussions:99019}      ____________________________________________________________________________________________________________________________________________      CHIEF COMPLAINT:  Chief Complaint   Patient presents with    Left Hand - Numbness     Patient see neuro who sent her back to ortho of arm/hand numbness       SUBJECTIVE:  Carmen Romero is a 44 y.o. year old RHD female who presents today for a consultation for her left elbow pain with numbness and tingling to the small and ring fingers referred by her PCP, Dr. Brianne Sal. She has been evaluated by Dr. Dacosta.   She has known foraminal stenosis C5-C6 on the left with an EMG that confirms radiculopathy in the C6 distribution. Today she reports ongoing pain to the left side of her neck down her arm into her fingers.       Pain/symptom timing:  Worse during the day when active  Pain/symptom context:  Worse with activites and work  Pain/symptom modifying factors:  Rest makes better, activities make worse  Pain/symptom associated signs/symptoms: none    Prior treatment   NSAIDs{ :15844::\"Yes\"}   Injections { :44344::\"Yes\"}   Bracing/Orthotics { :88692::\"Yes\"}  " "  Physical Therapy { :53818::\"Yes\"}     I have personally reviewed all the relevant PMH, PSH, SH, FH, Medications and allergies      PAST MEDICAL HISTORY:  Past Medical History:   Diagnosis Date    Gestational diabetes     Kidney stone        PAST SURGICAL HISTORY:  History reviewed. No pertinent surgical history.    FAMILY HISTORY:  Family History   Problem Relation Age of Onset    Breast cancer Mother 64    Diabetes Father     Brain cancer Sister     No Known Problems Daughter     Lung cancer Maternal Grandfather     Diabetes Paternal Grandmother     Breast cancer Maternal Aunt 50    Cancer Paternal Aunt     Cancer Paternal Aunt        SOCIAL HISTORY:  Social History[1]    MEDICATIONS:  Current Medications[2]    ALLERGIES:  Allergies[3]        REVIEW OF SYSTEMS:  Review of Systems    VITALS:  There were no vitals filed for this visit.    LABS:      _____________________________________________________  PHYSICAL EXAMINATION:  General: {1234:90341::\"well developed and well nourished\",\"alert\",\"oriented times 3\",\"appears comfortable\"}  Psychiatric: {Psych:49593::\"Normal\"}  HEENT: Normocephalic, Atraumatic Trachea Midline, No torticollis  Pulmonary: No audible wheezing or respiratory distress   Abdomen/GI: Non tender, non distended   Cardiovascular: No pitting edema, 2+ radial pulse   Skin: {Skin exam:81995}  Neurovascular: {Nerve Exam:59642::\"Sensation Intact to the Median, Ulnar, Radial Nerve\",\"Motor Intact to the Median, Ulnar, Radial Nerve\",\"Pulses Intact\"}  Musculoskeletal: Normal, except as noted in detailed exam and in HPI.      MUSCULOSKELETAL EXAMINATION:      ___________________________________________________  STUDIES REVIEWED:  I have personally reviewed and interpreted  AP lateral and oblique radiographs of ***   which demonstrate ***     I have personally reviewed and interpreted  US of *** which demonstrate ***     LABS REVIEWED:    HgA1c: No results found for: \"HGBA1C\"  BMP:   Lab Results   Component " "Value Date    CALCIUM 9.1 12/02/2023    K 3.9 12/02/2023    CO2 27 12/02/2023     12/02/2023    BUN 26 (H) 12/02/2023    CREATININE 0.91 12/02/2023               PROCEDURES PERFORMED:  Procedures  {Was Procdoc done:93088::\"-\"}    _____________________________________________________      Scribe Attestation      I,:   am acting as a scribe while in the presence of the attending physician.:       I,:   personally performed the services described in this documentation    as scribed in my presence.:                         [1]   Social History  Tobacco Use    Smoking status: Never    Smokeless tobacco: Never   Vaping Use    Vaping status: Never Used   Substance Use Topics    Alcohol use: Yes     Comment: socially     Drug use: Never   [2]   Current Outpatient Medications:     cyclobenzaprine (FLEXERIL) 10 mg tablet, Take 1 tablet (10 mg total) by mouth 2 (two) times a day as needed for muscle spasms, Disp: 60 tablet, Rfl: 0    ibuprofen (MOTRIN) 800 mg tablet, Take 800 mg by mouth every 6 (six) hours as needed for mild pain, Disp: , Rfl:     Tart Cherry 1200 MG CAPS, Take by mouth, Disp: , Rfl:     Turmeric 5-1000 MG CAPS, Take 1,000 mg by mouth in the morning and 1,000 mg in the evening., Disp: , Rfl:     diclofenac (VOLTAREN) 75 mg EC tablet, Take 1 tablet (75 mg total) by mouth 2 (two) times a day (Patient not taking: Reported on 5/8/2025), Disp: 60 tablet, Rfl: 1  No current facility-administered medications for this visit.  [3] No Known Allergies    "

## 2025-05-14 NOTE — PROGRESS NOTES
Kamla Hoyt M.D.  Attending, Orthopaedic Surgery  Hand, Wrist, and Elbow Surgery  [facility]      ORTHOPAEDIC HAND, WRIST, AND ELBOW OFFICE  VISIT       ASSESSMENT/PLAN:        Assessment & Plan  Ulnar neuritis, left  Despite objective findings of cervical radiculopathy I do suspect pathology at the cubital tunnel with possible carpal tunnel as well  Given negative EMG testing we will order US of the wrist and elbow to further evaluate these nerves   If positive findings on imaging will likely discuss surgical intervention      Orders:    US Cubital Tunnel; Future    Carpal tunnel syndrome on left  Patient did have improvement with bracing initially  While the ulnar nerve is more problematic   Orders:    US Carpal Tunnel; Future    Radiculopathy, cervical  EMG, MRI, CT, and XR of the C-spine shows some degenerative changes with radicular nerve changes   Management per neuro and pain management            The patient verbalized understanding of exam findings and treatment plan. We engaged in the shared decision-making process and treatment options were discussed at length with the patient. Surgical and conservative management discussed today along with risks and benefits.    Follow Up:  Return for After Testing.    To Do Next Visit:  Re-evaluation of current issue      General Discussions:  Cubital Tunnel Syndrome: The anatomy and physiology of cubital tunnel syndrome were discussed with the patient today in the office.  Typically, increased elbow flexion activities decrease blood flow within the intraneural spaces, resulting in a feeling of numbness, tingling, weakness, or clumsiness within the hand and fingers.  Occasionally, anatomic structures such as medial elbow osteophytes, the medial head of the triceps, were subluxing ulnar nerve may result in increased pressure or aggravation at the cubital tunnel.  Typical signs and symptoms usually include numbness and tingling within the ring  and small finger, weakness with , and weakness with pinch.  Conservative treatment and includes nocturnal bracing to keep the elbow in a semi-extended position, activity modification, therapy, and avoiding excessive elbow flexion activities.  Vitamin B6 one tablet daily over the counter may helpful to reduce symptoms.  A majority of patients typically respond to conservative treatment over a period of approximately 3-6 months.  EMG/NCV testing of the ulnar nerve at the elbow is not as reliable as carpal tunnel syndrome.  Surgical intervention in the form of in situ release of the ulnar nerve at the elbow or ulnar nerve transposition may be required in up to 20% of patients.     ____________________________________________________________________________________________________________________________________________      CHIEF COMPLAINT:  Chief Complaint   Patient presents with    Left Hand - Numbness     Patient see neuro who sent her back to ortho of arm/hand numbness       SUBJECTIVE:  Carmen Romero is a 44 y.o. year old ambidextrous female who presents for evaluation of the left upper extremity. She notes about one year hx of paresthesias and pain in the upper extremity. The discomfort began in the elbow and has since affected her neck as well. She notes intermittent paresthesias into the small and ring finger which seem to be more associated with elbow flexion and overhead movements. She was provided with a brace initially which did help with her night time symptoms at first. She has seen neurosurgery and pain management and undergone multiple testings for her symptoms as discussed below. She just had an epidural injection yesterday as well.        I have personally reviewed all the relevant PMH, PSH, SH, FH, Medications and allergies      PAST MEDICAL HISTORY:  Past Medical History:   Diagnosis Date    Gestational diabetes     Kidney stone        PAST SURGICAL HISTORY:  History reviewed. No pertinent  surgical history.    FAMILY HISTORY:  Family History   Problem Relation Age of Onset    Breast cancer Mother 64    Diabetes Father     Brain cancer Sister     No Known Problems Daughter     Lung cancer Maternal Grandfather     Diabetes Paternal Grandmother     Breast cancer Maternal Aunt 50    Cancer Paternal Aunt     Cancer Paternal Aunt        SOCIAL HISTORY:  Social History[1]    MEDICATIONS:  Current Medications[2]    ALLERGIES:  Allergies[3]        REVIEW OF SYSTEMS:  Review of Systems   Constitutional:  Negative for chills and fever.   HENT:  Negative for ear pain and sore throat.    Eyes:  Negative for pain and visual disturbance.   Respiratory:  Negative for cough and shortness of breath.    Cardiovascular:  Negative for chest pain and palpitations.   Gastrointestinal:  Negative for abdominal pain and vomiting.   Genitourinary:  Negative for dysuria and hematuria.   Musculoskeletal:  Positive for arthralgias and neck pain. Negative for back pain.   Skin:  Negative for color change and rash.   Neurological:  Positive for numbness. Negative for seizures and syncope.   All other systems reviewed and are negative.      VITALS:  There were no vitals filed for this visit.    LABS:      _____________________________________________________  PHYSICAL EXAMINATION:  General: well developed and well nourished, alert, oriented times 3, and appears comfortable  Psychiatric: Normal  HEENT: Normocephalic, Atraumatic Trachea Midline, No torticollis  Pulmonary: No audible wheezing or respiratory distress   Abdomen/GI: Non tender, non distended   Cardiovascular: No pitting edema, 2+ radial pulse   Skin: No masses, erythema, lacerations, fluctation, ulcerations  Neurovascular: Decreased Sensation to  the Ulnar Nerve, Motor Intact to the Median, Ulnar, Radial Nerve, and Pulses Intact  Musculoskeletal: Normal, except as noted in detailed exam and in HPI.      MUSCULOSKELETAL EXAMINATION:    Left Ulnar Nerve Exam:    Negative  "intrinsic atrophy.  Negative  deformity at the elbow.   Full range of motion with flexion and extension of the elbow.   Positive ulnar nerve compression test at the elbow. Positive tinels over the ulnar nerve at the elbow.  Negative cross finger test in the index and long fingers.   FDP strength is 4+/5 to the ring finger. FDP strength is 4+/5 to the small finger.  Intrinsic strength 4+/5 .    Left Carpal Tunnel Exam:    Negative thenar atrophy.  Phalen's test produces paresthesias in the small and ring finger. Negative carpal tunnel compression. Negative tinels over median nerve at the wrist.  Opposition strength 5/5.  Abduction strength 5/5.      ___________________________________________________  STUDIES REVIEWED:  EMG of the  evidence of the left upper extremity demonstrate:   1. Chronic C6 radiculopathy on the left as evidenced by the decreased recruitment and chronic denervation changes in the above-mentioned muscles.   2. There is no evidence of a median or ulnar neuropathy.    CT of the cervical spine demonstrate disc height loss and endplate osteophytosis at C4-5 and C5-6, no central canal stenosis    MRI C spine significant for:   C4-C5: Tiny right paracentral disc protrusion. No significant central canal or neural foraminal narrowing. Disc herniation is new from the prior study.     C5-C6: Left subarticular zone disc herniation, protrusion type. Mild narrowing left subarticular zone. Central canal and right neural foramen patent. This level is similar to the prior study.    LABS REVIEWED:    HgA1c: No results found for: \"HGBA1C\"  BMP:   Lab Results   Component Value Date    CALCIUM 9.1 12/02/2023    K 3.9 12/02/2023    CO2 27 12/02/2023     12/02/2023    BUN 26 (H) 12/02/2023    CREATININE 0.91 12/02/2023               PROCEDURES PERFORMED:  Procedures  -None    _____________________________________________________      Scribe Attestation      I,:  Carol Christine PA-C am acting as a scribe while in " the presence of the attending physician.:       I,:  Kamla Hoyt MD personally performed the services described in this documentation    as scribed in my presence.:                          [1]   Social History  Tobacco Use    Smoking status: Never    Smokeless tobacco: Never   Vaping Use    Vaping status: Never Used   Substance Use Topics    Alcohol use: Yes     Comment: socially     Drug use: Never   [2]   Current Outpatient Medications:     cyclobenzaprine (FLEXERIL) 10 mg tablet, Take 1 tablet (10 mg total) by mouth 2 (two) times a day as needed for muscle spasms, Disp: 60 tablet, Rfl: 0    ibuprofen (MOTRIN) 800 mg tablet, Take 800 mg by mouth every 6 (six) hours as needed for mild pain, Disp: , Rfl:     Tart Cherry 1200 MG CAPS, Take by mouth, Disp: , Rfl:     Turmeric 5-1000 MG CAPS, Take 1,000 mg by mouth in the morning and 1,000 mg in the evening., Disp: , Rfl:     diclofenac (VOLTAREN) 75 mg EC tablet, Take 1 tablet (75 mg total) by mouth 2 (two) times a day (Patient not taking: Reported on 5/8/2025), Disp: 60 tablet, Rfl: 1  [3] No Known Allergies

## 2025-05-14 NOTE — ASSESSMENT & PLAN NOTE
Patient did have improvement with bracing initially  While the ulnar nerve is more problematic   Orders:    US Carpal Tunnel; Future

## 2025-05-19 ENCOUNTER — OFFICE VISIT (OUTPATIENT)
Dept: PHYSICAL THERAPY | Facility: CLINIC | Age: 45
End: 2025-05-19
Payer: COMMERCIAL

## 2025-05-19 DIAGNOSIS — M54.12 CERVICAL RADICULOPATHY: Primary | ICD-10-CM

## 2025-05-19 PROCEDURE — 97012 MECHANICAL TRACTION THERAPY: CPT

## 2025-05-19 PROCEDURE — 97140 MANUAL THERAPY 1/> REGIONS: CPT

## 2025-05-19 PROCEDURE — 97110 THERAPEUTIC EXERCISES: CPT

## 2025-05-19 NOTE — PROGRESS NOTES
Daily Note     Today's date: 2025  Patient name: Carmen Romero  : 1980  MRN: [id number]  Referring provider: Esteban Dacosta MD  Dx:   Encounter Diagnosis     ICD-10-CM    1. Cervical radiculopathy  M54.12           Start Time: 817  Stop Time: 0900  Total time in clinic (min): 43 minutes    Subjective: Pt received an epidural in her C/S last week. She is now able to reach her arm higher over head. She continues to have neck and L shoulder pain.       Objective: See treatment diary below      Assessment: Tolerated treatment well, reporting a slight headache during traction that was discontinued. Focused on MT following that reduced pain with ULTT.  Educated pt on continuing with HEP and monitor symptoms to assess for treatment carryover. Patient would benefit from continued PT      Plan: Progress treatment as tolerated.       Precautions: NA  POC expires Unit limit Auth Expiration date PT/OT/ST + Visit Limit?   25 4 NA 90                           Visit/Unit Tracking  AUTH Status:  Date             NA Used 1 2 3             Remaining  89 88 87                  Manuals           SOR SG SG SG          Median n glide  SG SG          L side C/S opening mobs  SG SG                       Neuro Re-Ed             Median nerve glide HEP                                                                                          Ther Ex             Pt edu SG SG SG          Self traction HEP            Self SOR HEP                                                                             Ther Activity                                       Gait Training                                       Modalities             C/S traction  # '  #

## 2025-05-23 ENCOUNTER — HOSPITAL ENCOUNTER (OUTPATIENT)
Dept: ULTRASOUND IMAGING | Facility: HOSPITAL | Age: 45
Discharge: HOME | End: 2025-05-23
Attending: PHYSICIAN ASSISTANT
Payer: COMMERCIAL

## 2025-05-23 DIAGNOSIS — G56.02 CARPAL TUNNEL SYNDROME ON LEFT: ICD-10-CM

## 2025-05-23 DIAGNOSIS — G56.22 ULNAR NEURITIS, LEFT: ICD-10-CM

## 2025-05-23 PROCEDURE — 76882 US LMTD JT/FCL EVL NVASC XTR: CPT

## 2025-05-27 ENCOUNTER — TELEPHONE (OUTPATIENT)
Dept: PAIN MEDICINE | Facility: CLINIC | Age: 45
End: 2025-05-27

## 2025-05-27 ENCOUNTER — OFFICE VISIT (OUTPATIENT)
Dept: PHYSICAL THERAPY | Facility: CLINIC | Age: 45
End: 2025-05-27
Attending: ORTHOPAEDIC SURGERY
Payer: COMMERCIAL

## 2025-05-27 DIAGNOSIS — M54.12 CERVICAL RADICULOPATHY: Primary | ICD-10-CM

## 2025-05-27 PROCEDURE — 97112 NEUROMUSCULAR REEDUCATION: CPT

## 2025-05-27 PROCEDURE — 97140 MANUAL THERAPY 1/> REGIONS: CPT

## 2025-05-27 NOTE — TELEPHONE ENCOUNTER
Aware thanks. She can follow up after ulnar nerve surgery and PT if symptoms are still ongoing after surgery

## 2025-05-27 NOTE — PROGRESS NOTES
Daily Note     Today's date: 2025  Patient name: Carmen Romero  : 1980  MRN: [id number]  Referring provider: Esteban Dacosta MD  Dx:   Encounter Diagnosis     ICD-10-CM    1. Cervical radiculopathy  M54.12             Start Time: 0730  Stop Time: 0810  Total time in clinic (min): 40 minutes    Subjective: Pt received ultrasounds on both her cubital tunnel and carpal tunnel. She plans on undergoing ulnar nerve transposition surgery in two weeks.       Objective: See treatment diary below      Assessment: Continued with manual/passive approach to c/s and ulnar nerve 2/2 recent US findings. Updated HEP with ulnar nerve specific mobility to reduce local inflammation at the cubital tunnel. Pt reported improved symptoms following self ulnar nerve flossing/gliding. Patient would benefit from continued PT      Plan: Progress treatment as tolerated.       Precautions: NA  POC expires Unit limit Auth Expiration date PT/OT/ST + Visit Limit?   25 4 NA 90                           Visit/Unit Tracking  AUTH Status:  Date            NA Used 1 2 3 4            Remaining  89 88 87 86                 Manuals          SOR SG SG SG SG         Median n glide  SG SG          Ulnar nerve glide    SG         L side C/S opening mobs  SG SG SG         STM and effleurage to C/S     SG         Neuro Re-Ed             Median nerve glide HEP            Ulnar nerve glide    2x10  [place] neck, no shoulder abduction         Prayer position ulnar nerve floss    2x10                                                             Ther Ex             Pt edu SG SG SG          Self traction HEP            Self SOR HEP                                                                             Ther Activity                                       Gait Training                                       Modalities             C/S traction  # 20'  20#

## 2025-05-30 ENCOUNTER — APPOINTMENT (OUTPATIENT)
Dept: PHYSICAL THERAPY | Facility: CLINIC | Age: 45
End: 2025-05-30
Attending: ORTHOPAEDIC SURGERY
Payer: COMMERCIAL

## 2025-06-02 ENCOUNTER — OFFICE VISIT (OUTPATIENT)
Dept: PHYSICAL THERAPY | Facility: CLINIC | Age: 45
End: 2025-06-02
Attending: ORTHOPAEDIC SURGERY
Payer: COMMERCIAL

## 2025-06-02 DIAGNOSIS — M54.12 CERVICAL RADICULOPATHY: Primary | ICD-10-CM

## 2025-06-02 PROCEDURE — 97140 MANUAL THERAPY 1/> REGIONS: CPT

## 2025-06-02 PROCEDURE — 97012 MECHANICAL TRACTION THERAPY: CPT

## 2025-06-02 PROCEDURE — 97110 THERAPEUTIC EXERCISES: CPT

## 2025-06-02 NOTE — PROGRESS NOTES
Daily Note     Today's date: 2025  Patient name: Carmen Romero  : 1980  MRN: [id number]  Referring provider: Esteban Dacosta MD  Dx:   Encounter Diagnosis     ICD-10-CM    1. Cervical radiculopathy  M54.12             Start Time: 817  Stop Time: 904  Total time in clinic (min): 47 minutes    Subjective: Pt reports struggling to lift a bag of sugar in her L arm this weekend. She tried to complete her HEP but did not notice much benefit. She has her surgical consult this Wednesday for ulnar nerve transposition.      Objective: See treatment diary below      Assessment: Continued with manual/passive approach today with good results. Pt continues to present with irritability in the L side of her C/S and upper arm, as well as weakness in the LUE. Pt has her surgical consult for ulnar nerve transposition this week to determine next steps.  Patient would benefit from continued PT for 1-2 visits to determine appropriate next steps.       Plan: Progress treatment as tolerated.       Precautions: NA  POC expires Unit limit Auth Expiration date PT/OT/ST + Visit Limit?   25 4 NA 90                           Visit/Unit Tracking  AUTH Status:  Date           NA Used 1 2 3 4 5           Remaining  89 88 87 86 85                Manuals         SOR SG SG SG SG SG        Median n glide  SG SG  SG        Ulnar nerve glide    SG SG        L side C/S opening mobs  SG SG SG SG        STM and effleurage to C/S     SG         Neuro Re-Ed             Median nerve glide HEP            Ulnar nerve glide    2x10  [place] neck, no shoulder abduction         Prayer position ulnar nerve floss    2x10                                                             Ther Ex             Pt edu SG SG SG  SG        Self traction HEP            Self SOR HEP                                                                             Ther Activity                                       Gait  Training                                       Modalities             C/S traction  12' 20# 20'  20#  10'  20#

## 2025-06-04 ENCOUNTER — OFFICE VISIT (OUTPATIENT)
Dept: OBGYN CLINIC | Facility: CLINIC | Age: 45
End: 2025-06-04
Payer: COMMERCIAL

## 2025-06-04 VITALS — HEIGHT: 67 IN | WEIGHT: 145 LBS | BODY MASS INDEX: 22.76 KG/M2

## 2025-06-04 DIAGNOSIS — G56.22 CUBITAL TUNNEL SYNDROME ON LEFT: ICD-10-CM

## 2025-06-04 DIAGNOSIS — G56.02 CARPAL TUNNEL SYNDROME OF LEFT WRIST: Primary | ICD-10-CM

## 2025-06-04 PROCEDURE — 99214 OFFICE O/P EST MOD 30 MIN: CPT | Performed by: SURGERY

## 2025-06-04 RX ORDER — CHLORHEXIDINE GLUCONATE ORAL RINSE 1.2 MG/ML
15 SOLUTION DENTAL ONCE
OUTPATIENT
Start: 2025-06-04 | End: 2025-06-04

## 2025-06-04 RX ORDER — SODIUM CHLORIDE 9 MG/ML
75 INJECTION, SOLUTION INTRAVENOUS CONTINUOUS
OUTPATIENT
Start: 2025-06-04

## 2025-06-04 NOTE — PROGRESS NOTES
ASSESSMENT/PLAN:      Assessment & Plan  Carpal tunnel syndrome of left wrist  Cubital tunnel syndrome on left  The Us's were reviewed in the office today which demonstrate left carpal and cubital tunnel syndrome. Discussed continued non operative versus surgical intervention. The patient elected to proceed with surgical intervention. Surgical intervention was discussed in the form of left carpal tunnel release and left cubital tunnel release with possible ulnar nerve transposition. The patient elected to proceed with surgical intervention. Risks of the surgery are inclusive of but not limited to bleeding, infection, nerve injury, blood clot, worsening of symptoms, not achieving the anticipated results, persistent stiffness, weakness, pillar pain and the need for additional surgery. The patient verbally stated they understood those risks and would like to proceed with the surgery. Surgical consent was signed in the office today. I will see the patient the day of surgery.                  The patient verbalized understanding of exam findings and treatment plan. We engaged in the shared decision-making process and treatment options were discussed at length with the patient. Surgical and conservative management discussed today along with risks and benefits.    Diagnoses and all orders for this visit:    Carpal tunnel syndrome of left wrist    Cubital tunnel syndrome on left          Cubital Tunnel Release:   The anatomy and physiology of cubital tunnel syndrome were discussed with the patient today in the office.  Typically, increased elbow flexion activities decrease blood flow within the intraneural spaces, resulting in a feeling of numbness, tingling, weakness, or clumsiness within the hand and fingers.  Occasionally, anatomic structures such as medial elbow osteophytes, the medial head of the triceps, were subluxing ulnar nerve may result in increased pressure or aggravation at the cubital tunnel.  Typical signs  and symptoms usually include numbness and tingling within the ring and small finger, weakness with , and weakness with pinch.  Conservative treatment and includes nocturnal bracing to keep the elbow in a semi-extended position, activity modification, therapy, and avoiding excessive elbow flexion activities.  A majority of patients typically respond to conservative treatment over a period of approximately 3-6 months.  Vitamin B6 one tablet daily over the counter may helpful to reduce symptoms.  EMG/NCV testing of the ulnar nerve at the elbow is not as reliable as carpal tunnel syndrome.  Surgical intervention in the form of in situ release of the ulnar nerve at the elbow or ulnar nerve transposition may be required in up to 20% of patients. The patient has elected to undergo cubital tunnel release.  The possibility of converting to a subcutaneous or submuscular ulnar nerve transposition depending on the nerve stability was discussed with the patient.  Typically, in the postoperative period, light activities are allowed immediately, driving is allowed when narcotic medications have stopped, and the incision may get wet after 5 days.  Heavy activities will be allowed after follow up appointment in 1-2 weeks.  While the pain within the ring and small finger of the hands generally improves rapidly, the numbness and tingling, as well as the strength, will slowly improve over a period of weeks to months.  Total recovery can take up to 18 months from the time of surgery.  Numbness and tingling near the incision, or near the medial aspect of the forearm was discussed with the patient.  The patient has an understanding of the above mentioned discussion. The risks and benefits of the procedure were explained to the patient, which include, but are not limited to: Bleeding, infection, recurrence, pain, scar, damage to tendons, damage to nerves, and damage to blood vessels, failure to give desired results and complications  related to anesthesia.  These risks, along with alternative conservative treatment options, and postoperative protocols were voiced back and understood by the patient.  All questions were answered to the patient's satisfaction.  The patient agrees to comply with a standard postoperative protocol, and is willing to proceed.  Education was provided via written and auditory forms.  There were no barriers to learning. Written handouts regarding wound care, incision and scar care, and general preoperative information was provided to the patient.  Prior to surgery, the patient may be requested to stop all anti-inflammatory medications.  Prophylactic aspirin, Plavix, and Coumadin may be allowed to be continued.  Medications including vitamin E., ginkgo, and fish oil are requested to be stopped approximately one week prior to surgery.  Hypertensive medications and beta blockers, if taken, should be continued. Vitamin B6 one tablet daily over the counter may helpful to reduce symptoms.   and Open Carpal Tunnel Release:   The anatomy and physiology of carpal tunnel syndrome was discussed with the patient today.  Increase pressure localized under the transverse carpal ligament can cause pain, numbness, tingling, or dysesthesias within the median nerve distribution as well as feelings of fatigue, clumsiness, or awkwardness.  These symptoms typically occur at night and worse in the morning upon waking.  Eventually, untreated carpal tunnel syndrome can result in weakness and permanent loss of muscle within the thenar compartment of the hand.  Treatment options were discussed with the patient.  Conservative treatment includes nocturnal resting splints to keep the nerve in a neutral position, ergonomic changes within the work or home environment, activity modification, and tendon gliding exercises.  Vitamin B6 one tablet daily over the counter may helpful to reduce symptoms.  Steroid injections within the carpal canal can help a  majority of patients, however this is often self-limited in a majority of patients.  Surgical intervention to divide the transverse carpal ligament typically results in a long-lasting relief of the patient's complaints, with the recurrence rate of less than 1%. The patient has elected to undergo an open carpal tunnel release.  The palmar incision technique was discussed in the office with the patient today.   In the postoperative period, light activities are allowed immediately, driving is allowed when narcotic medication has stopped, and the bandages may be removed and incision may get wet after 2 days.  Heavy activities (lifting more than approximately 10 pounds) will be allowed after follow up appointment in 1-2 weeks.  While night symptoms (waking from sleep, pain, and discomfort in the hands) generally improves rapidly, the numbness and tingling as well as the strength will slowly improve over weeks to months depending on the chronicity and severity of the carpal tunnel syndrome.  Pillar pain and scar discomfort were discussed with the patient which are self-limiting conditions.  The risks of bleeding and infection from the surgery are less than 1%.  Risk of recurrence is approximately 0.5%.  The risks of nerve injury or nerve damage or damage to the blood vessels is approximately 1 in 1200. The patient has an understanding of the above mentioned discussion.The risks and benefits of the procedure were explained to the patient, which include, but are not limited to: Bleeding, infection, recurrence, pain, scar, damage to tendons, damage to nerves, and damage to blood vessels, failure to give desired results and complications related to anesthesia.  These risks, along with alternative conservative treatment options, and postoperative protocols were voiced back and understood by the patient.  All questions were answered to the patient's satisfaction.  The patient agrees to comply with a standard postoperative  protocol, and is willing to proceed.  Education was provided via written and auditory forms.  There were no barriers to learning. Written handouts regarding wound care, incision and scar care, and general preoperative information was provided to the patient.  Prior to surgery, the patient may be requested to stop all anti-inflammatory medications.  Prophylactic aspirin, Plavix, and Coumadin may be allowed to be continued.  Medications including vitamin E., ginkgo, and fish oil are requested to be stopped approximately one week prior to surgery.  Hypertensive medications and beta blockers, if taken, s    Follow Up:  Return for After surgery.      To Do Next Visit:  Sutures out    ____________________________________________________________________________________________________________________________________________      CHIEF COMPLAINT:  Chief Complaint   Patient presents with    Left Hand - Numbness     US review       SUBJECTIVE:  Carmen Romero is a 44 y.o. year old ambidextrous  female who presents to the office today for follow up evaluation left hand numbness and tingling. At her last visit, US's were ordered to evaluate for left carpal and cubital tunnel syndrome. The patient notes intermittent numbness and tingling to all digits. She states this is worse at night. She tried night time bracing which helped but caused shoulder pain. She states she feels like she wants to drop objects at times.         I have personally reviewed all the relevant PMH, PSH, SH, FH, Medications and allergies.     PAST MEDICAL HISTORY:  Past Medical History[1]    PAST SURGICAL HISTORY:  Past Surgical History[2]    FAMILY HISTORY:  Family History[3]    SOCIAL HISTORY:  Social History[4]    MEDICATIONS:  Current Medications[5]    ALLERGIES:  Allergies[6]    REVIEW OF SYSTEMS:  Review of Systems   Constitutional:  Negative for chills and fever.   HENT:  Negative for drooling and sneezing.    Eyes:  Negative for redness.    Respiratory:  Negative for cough and wheezing.    Gastrointestinal:  Negative for nausea and vomiting.   Musculoskeletal:  Negative for arthralgias, joint swelling and myalgias.   Neurological:  Negative for weakness and numbness.   Psychiatric/Behavioral:  Negative for behavioral problems. The patient is not nervous/anxious.        VITALS:  There were no vitals filed for this visit.      _____________________________________________________  PHYSICAL EXAMINATION:  General: well developed and well nourished, alert, oriented times 3, and appears comfortable  Psychiatric: Normal  HEENT: Normocephalic, Atraumatic Trachea Midline, No torticollis  Pulmonary: No audible wheezing or respiratory distress   Cardiovascular: No pitting edema, 2+ radial pulse   Abdominal/GI: abdomen non tender, non distended   Skin: No masses, erythema, lacerations, fluctation, ulcerations  Neurovascular: Sensation Intact to the Median, Ulnar, Radial Nerve, Motor Intact to the Median, Ulnar, Radial Nerve, and Pulses Intact  Musculoskeletal: Normal, except as noted in detailed exam and in HPI.      MUSCULOSKELETAL EXAMINATION:  Left Ulnar Nerve Exam:     Negative intrinsic atrophy.  Negative  deformity at the elbow.   Full range of motion with flexion and extension of the elbow.   Positive ulnar nerve compression test at the elbow. Positive tinels over the ulnar nerve at the elbow.  Negative cross finger test in the index and long fingers.   FDP strength is 4+/5 to the ring finger. FDP strength is 4+/5 to the small finger.  Intrinsic strength 4+/5 .     Left Carpal Tunnel Exam:     Negative thenar atrophy.  Phalen's test produces paresthesias in the small and ring finger. Negative carpal tunnel compression. Negative tinels over median nerve at the wrist.  Opposition strength 5/5.  Abduction strength 5/5.     ___________________________________________________    STUDIES REVIEWED:      I have personally reviewed and interpreted  US of left  "wrist and elbow which demonstrate left carpal and cubital tunnel syndrome.     LABS REVIEWED:    HgA1c: No results found for: \"HGBA1C\"  BMP:   Lab Results   Component Value Date    CALCIUM 9.1 12/02/2023    K 3.9 12/02/2023    CO2 27 12/02/2023     12/02/2023    BUN 26 (H) 12/02/2023    CREATININE 0.91 12/02/2023             PROCEDURES PERFORMED:  Procedures  No Procedures performed today    _____________________________________________________      Scribe Attestation      I,:  Gracie Mcmanus MA am acting as a scribe while in the presence of the attending physician.:       I,:  Kamla Hoyt MD personally performed the services described in this documentation    as scribed in my presence.:                        [1]   Past Medical History:  Diagnosis Date    Gestational diabetes     Kidney stone    [2] No past surgical history on file.  [3]   Family History  Problem Relation Name Age of Onset    Breast cancer Mother  64    Diabetes Father      Brain cancer Sister      No Known Problems Daughter      Lung cancer Maternal Grandfather      Diabetes Paternal Grandmother      Breast cancer Maternal Aunt  50    Cancer Paternal Aunt      Cancer Paternal Aunt     [4]   Social History  Tobacco Use    Smoking status: Never    Smokeless tobacco: Never   Vaping Use    Vaping status: Never Used   Substance Use Topics    Alcohol use: Yes     Comment: socially     Drug use: Never   [5]   Current Outpatient Medications:     cyclobenzaprine (FLEXERIL) 10 mg tablet, Take 1 tablet (10 mg total) by mouth 2 (two) times a day as needed for muscle spasms, Disp: 60 tablet, Rfl: 0    ibuprofen (MOTRIN) 800 mg tablet, Take 800 mg by mouth every 6 (six) hours as needed for mild pain, Disp: , Rfl:     Tart Cherry 1200 MG CAPS, Take by mouth, Disp: , Rfl:     Turmeric 5-1000 MG CAPS, Take 1,000 mg by mouth in the morning and 1,000 mg in the evening., Disp: , Rfl:     diclofenac (VOLTAREN) 75 mg EC tablet, Take 1 tablet (75 mg " total) by mouth 2 (two) times a day (Patient not taking: Reported on 5/8/2025), Disp: 60 tablet, Rfl: 1  [6] No Known Allergies

## 2025-06-06 ENCOUNTER — APPOINTMENT (OUTPATIENT)
Dept: PHYSICAL THERAPY | Facility: CLINIC | Age: 45
End: 2025-06-06
Attending: ORTHOPAEDIC SURGERY
Payer: COMMERCIAL

## 2025-06-09 ENCOUNTER — OFFICE VISIT (OUTPATIENT)
Dept: PHYSICAL THERAPY | Facility: CLINIC | Age: 45
End: 2025-06-09
Attending: ORTHOPAEDIC SURGERY
Payer: COMMERCIAL

## 2025-06-09 DIAGNOSIS — M54.12 CERVICAL RADICULOPATHY: Primary | ICD-10-CM

## 2025-06-09 PROCEDURE — 97112 NEUROMUSCULAR REEDUCATION: CPT

## 2025-06-09 PROCEDURE — 97012 MECHANICAL TRACTION THERAPY: CPT

## 2025-06-09 PROCEDURE — 97110 THERAPEUTIC EXERCISES: CPT

## 2025-06-09 NOTE — PROGRESS NOTES
Daily Note     Today's date: 2025  Patient name: Carmen Romero  : 1980  MRN: [id number]  Referring provider: Esteban Dacosta MD  Dx:   Encounter Diagnosis     ICD-10-CM    1. Cervical radiculopathy  M54.12               Start Time: 816  Stop Time: 0900  Total time in clinic (min): 44 minutes    Subjective: Pt reports she is undergoing cubital/carpal tunnel release on 25. She wishes to continue her treatment for her neck at this time. She reports she sees roughly 2-3 days worth of relief following each session.       Objective: See treatment diary below      Assessment: Continued with manual/passive approach today with good results. Introduced chin tucks in supine for pt to work on at home until NV. Pt struggled to grasp movement 2/2 motor control deficiency, but was able to perform movement without compensations when transitioning to the supine position. Pt will continue to benefit from skilled PT.       Plan: Progress treatment as tolerated.       Precautions: NA  POC expires Unit limit Auth Expiration date PT/OT/ST + Visit Limit?   25 4 NA 90                           Visit/Unit Tracking  AUTH Status:  Date          NA Used 1 2 3 4 5 6          Remaining  89 88 87 86 85 84               Manuals        SOR SG SG SG SG SG SG       Median n glide  SG SG  SG        Ulnar nerve glide    SG SG        L side C/S opening mobs  SG SG SG SG SG       STM and effleurage to C/S     SG         Neuro Re-Ed             Median nerve glide HEP            Ulnar nerve glide    2x10  Blach[place] neck, no shoulder abduction         Prayer position ulnar nerve floss    2x10         Chin tucks      2x20                                              Ther Ex             Pt edu SG SG SG  SG SG       Self traction HEP            Self SOR HEP                                                                             Ther Activity                                       Gait  Training                                       Modalities             C/S traction  12' 20# 20'  20#  10'  20# 12' 20#  Intermittent, cycle, intermittent

## 2025-06-12 ENCOUNTER — APPOINTMENT (OUTPATIENT)
Dept: PHYSICAL THERAPY | Facility: CLINIC | Age: 45
End: 2025-06-12
Attending: ORTHOPAEDIC SURGERY
Payer: COMMERCIAL

## 2025-06-16 ENCOUNTER — APPOINTMENT (OUTPATIENT)
Dept: PHYSICAL THERAPY | Facility: CLINIC | Age: 45
End: 2025-06-16
Attending: ORTHOPAEDIC SURGERY
Payer: COMMERCIAL

## 2025-06-19 ENCOUNTER — APPOINTMENT (OUTPATIENT)
Dept: PHYSICAL THERAPY | Facility: CLINIC | Age: 45
End: 2025-06-19
Attending: ORTHOPAEDIC SURGERY
Payer: COMMERCIAL

## 2025-06-25 ENCOUNTER — OFFICE VISIT (OUTPATIENT)
Dept: PHYSICAL THERAPY | Facility: CLINIC | Age: 45
End: 2025-06-25
Attending: ORTHOPAEDIC SURGERY
Payer: COMMERCIAL

## 2025-06-25 DIAGNOSIS — M54.12 CERVICAL RADICULOPATHY: Primary | ICD-10-CM

## 2025-06-25 PROCEDURE — 97112 NEUROMUSCULAR REEDUCATION: CPT

## 2025-06-25 PROCEDURE — 97110 THERAPEUTIC EXERCISES: CPT

## 2025-06-25 NOTE — PROGRESS NOTES
Daily Note     Today's date: 2025  Patient name: Carmen Romero  : 1980  MRN: [id number]  Referring provider: Esteban Dacosta MD  Dx:   Encounter Diagnosis     ICD-10-CM    1. Cervical radiculopathy  M54.12                 Start Time: 820  Stop Time: 0900  Total time in clinic (min): 40 minutes    Subjective: Pt reports no significant change in symptoms since last visit. She feels that her left arm is getting slightly weaker.       Objective: See treatment diary below     strength   R  73.6  68.1  62.1  Avg 67.9    L  59.7  63.4  61.9  Avg 61.6    Post MT  L  strength 33.1    Assessment: Continued with manual/passive approach today with fair results.  strength significantly decreased following MT, which likely did not cause strength decrease, rather pt's general endurance decreased. Issues present at cubital tunnel likely also played a part in strength reduction.  Pt will continue to benefit from skilled PT.       Plan: Progress treatment as tolerated.       Precautions: NA  POC expires Unit limit Auth Expiration date PT/OT/ST + Visit Limit?   25 4 NA 90                           Visit/Unit Tracking  AUTH Status:  Date         NA Used 1 2 3 4 5 6 7         Remaining  89 88 87 86 85 84 83              Manuals       SOR SG SG SG SG SG SG SG      Median n glide  SG SG  SG  SG      Ulnar nerve glide    SG SG  SG      L side C/S opening mobs  SG SG SG SG SG SG      STM and effleurage to C/S     SG         Neuro Re-Ed             Median nerve glide HEP            Ulnar nerve glide    2x10  Burna neck, no shoulder abduction         Prayer position ulnar nerve floss    2x10         Chin tucks      2x20 2x20                                             Ther Ex             Pt edu SG SG SG  SG SG SG      Self traction HEP            Self SOR HEP                                                                             Ther Activity                                        Gait Training                                       Modalities             C/S traction  12' 20# 20'  20#  10'  20# 12' 20#  Intermittent, cycle, intermittent

## 2025-06-26 ENCOUNTER — ANESTHESIA EVENT (OUTPATIENT)
Age: 45
End: 2025-06-26

## 2025-06-26 ENCOUNTER — ANESTHESIA (OUTPATIENT)
Age: 45
End: 2025-06-26

## 2025-06-27 ENCOUNTER — APPOINTMENT (OUTPATIENT)
Dept: PHYSICAL THERAPY | Facility: CLINIC | Age: 45
End: 2025-06-27
Attending: ORTHOPAEDIC SURGERY
Payer: COMMERCIAL

## 2025-06-30 ENCOUNTER — ANESTHESIA EVENT (OUTPATIENT)
Age: 45
End: 2025-06-30
Payer: COMMERCIAL

## 2025-06-30 ENCOUNTER — OFFICE VISIT (OUTPATIENT)
Dept: PHYSICAL THERAPY | Facility: CLINIC | Age: 45
End: 2025-06-30
Attending: ORTHOPAEDIC SURGERY
Payer: COMMERCIAL

## 2025-06-30 DIAGNOSIS — M54.12 CERVICAL RADICULOPATHY: Primary | ICD-10-CM

## 2025-06-30 PROCEDURE — 97110 THERAPEUTIC EXERCISES: CPT

## 2025-06-30 NOTE — PROGRESS NOTES
"Daily Note     Today's date: 2025  Patient name: Carmen Romero  : 1980  MRN: [id number]  Referring provider: Esteban Dacosta MD  Dx:   Encounter Diagnosis     ICD-10-CM    1. Cervical radiculopathy  M54.12                   Start Time: 822  Stop Time: 905  Total time in clinic (min): 43 minutes    Subjective: Pt reports no significant change in symptoms since last visit.      Objective: See treatment diary below      Assessment: Attempted a more active approach to treatment today that was tolerated fairly by pt. Supine strengthening was tolerated well, but standing exercise resulted in compensation of the c/s musculature and associated discomfort. PA mobilization to the t/s was poorly tolerated and resulted in referred c/s pain. Pt will be seen once more prior to cubital tunnel release surgery.  Pt will continue to benefit from skilled PT.       Plan: Progress treatment as tolerated.  Supine strengthening and c/s mobilization     Precautions: NA  POC expires Unit limit Auth Expiration date PT/OT/ST + Visit Limit?   25 4 NA 90                           Visit/Unit Tracking  AUTH Status:  Date        NA Used 1 2 3 4 5 6 7 8        Remaining  89 88 87 86 85 84 83 82             Manuals      SOR SG SG SG SG SG SG SG      Median n glide  SG SG  SG  SG      Ulnar nerve glide    SG SG  SG      L side C/S opening mobs  SG SG SG SG SG SG      STM and effleurage to C/S     SG         PA mobs        SG     Neuro Re-Ed             Median nerve glide HEP            Ulnar nerve glide    2x10  [place]omer neck, no shoulder abduction         Prayer position ulnar nerve floss    2x10         Chin tucks      2x20 2x20                                             Ther Ex             Pt edu SG SG SG  SG SG SG SG     Self traction HEP            Self SOR HEP            C/s flexion stretch        3x30\"     U/L tricep ext        3#  2x15     Chest fly    "     3#  2x15     rows        10#  3x10                  Ther Activity                                       Gait Training                                       Modalities             C/S traction  12' 20# 20'  20#  10'  20# 12' 20#  Intermittent, cycle, intermittent

## 2025-07-02 NOTE — PRE-PROCEDURE INSTRUCTIONS
Pre-Surgery Instructions:   Medication Instructions    cyclobenzaprine (FLEXERIL) 10 mg tablet Uses PRN- OK to take day of surgery    ibuprofen (MOTRIN) 800 mg tablet Stop taking 7 days prior to surgery.    Tart Vivas 1200 MG CAPS Stop taking 7 days prior to surgery.    Turmeric 5-1000 MG CAPS Stop taking 7 days prior to surgery.   Medication instructions for day of surgery reviewed. Please take all instructed medications with only a sip of water. Please do not take any over the counter (non-prescribed) vitamins or supplements for one week prior to date of surgery.      You will receive a call one business day prior to surgery with an arrival time and hospital directions. If your surgery is scheduled on a Monday, the hospital will be calling you on the Friday prior to your surgery. If you have not heard from anyone by 8pm, please call the hospital supervisor through the hospital  at [phone]. ([place] [phone] or [place] [phone]).    Do not eat or drink anything after midnight the night before your surgery, including candy, mints, lifesavers, or chewing gum. Do not drink alcohol 24hrs before your surgery. Try not to smoke at least 24hrs before your surgery.       Follow the pre surgery showering instructions as listed in the “My Surgical Experience Booklet” or otherwise provided by your surgeon's office. Do not use a blade to shave the surgical area 1 week before surgery. It is okay to use a clean electric clippers up to 24 hours before surgery. Do not apply any lotions, creams, including makeup, cologne, deodorant, or perfumes after showering on the day of your surgery. Do not use dry shampoo, hair spray, hair gel, or any type of hair products.     No contact lenses, eye make-up, or artificial eyelashes. Remove nail polish, including gel polish, and any artificial, gel, or acrylic nails if possible. Remove all jewelry including rings and body piercing jewelry.     Wear causal clothing that  is easy to take on and off. Consider your type of surgery.    Keep any valuables, jewelry, piercings at home. Please bring any specially ordered equipment (sling, braces) if indicated.    Arrange for a responsible person to drive you to and from the hospital on the day of your surgery. Please confirm the visitor policy for the day of your procedure when you receive your phone call with an arrival time.     Call the surgeon's office with any new illnesses, exposures, or additional questions prior to surgery.    Please reference your “My Surgical Experience Booklet” for additional information to prepare for your upcoming surgery.    Pt. Verbalized an understanding of all instructions reviewed and offers no concerns at this time.

## 2025-07-09 ENCOUNTER — OFFICE VISIT (OUTPATIENT)
Age: 45
End: 2025-07-09
Payer: COMMERCIAL

## 2025-07-09 VITALS — BODY MASS INDEX: 23.07 KG/M2 | HEIGHT: 67 IN | WEIGHT: 147 LBS

## 2025-07-09 DIAGNOSIS — M62.89 PELVIC FLOOR DYSFUNCTION: Primary | ICD-10-CM

## 2025-07-09 PROCEDURE — 99203 OFFICE O/P NEW LOW 30 MIN: CPT | Performed by: SURGERY

## 2025-07-09 NOTE — PROGRESS NOTES
:  Assessment & Plan  Pelvic floor dysfunction    Orders:    Ambulatory Referral to Physical Therapy; Future      Assessment & Plan  1. Thrombosed external hemorrhoid.  Symptoms suggest the presence of internal hemorrhoids, but a full examination was not possible today due to discomfort. The use of creams or suppositories for hemorrhoids is not recommended as they can potentially irritate the skin and exacerbate issues with fissures or itchiness. The possibility of surgical intervention was discussed, but it was decided to wait until after her upcoming surgery and recovery period. She was informed that if she is prescribed pain medications such as oxycodone, Percocet, or Vicodin, it could lead to constipation and potentially worsen her condition. A referral for pelvic floor physical therapy was made to address the underlying issue of pelvic floor dysfunction. She was advised to return in 2 months to discuss the possibility of excising the hemorrhoid in the office. If physical therapy does not alleviate her symptoms, a colonoscopy will be considered as the next step.    2. Pelvic floor dysfunction.  The patient reports a pressure-type feeling during her periods, which is not due to the hemorrhoid. This sensation is likely due to a problem with relaxation of the pelvic floor muscles. A referral for pelvic floor physical therapy was made to help with relaxation and breathing exercises. The physical therapist may need to perform internal examinations to ensure proper muscle relaxation.    Follow-up  The patient will follow up in 2 months.      History of Present Illness     Carmen Romero is a 44 y.o. female   History of Present Illness  The patient is a 44-year-old female who presents for a consult for hemorrhoids.    She reports a persistent issue with her hemorrhoids, which she had hoped would resolve on its own. This is the first time the hemorrhoid has reached this size. Her initial encounter with hemorrhoids was 16  years ago, following the birth of her son. At that time, she experienced puffiness and itching around her anus, which was treated with a cream. In 2009, she suffered a ski accident that resulted in a broken lower back and subsequent sciatica. Since then, she has noticed that severe lower back pain coincides with a sensation of internal pressure in her rectum. However, no significant changes were observed until the birth of her daughter in 2019. During this period, she used glycerin suppositories and various creams to manage the symptoms, which included softness and itchiness around the anus. These symptoms eventually subsided. A few years later, she began to experience unusual pressure during her menstrual periods, even in the absence of lower back pain. This pressure was so intense that it felt as if something was trying to push out from inside her rectum, causing discomfort and necessitating sitting down. This occurred 2 to 3 times a year, but recently, it has been happening every or every other period. Three weeks ago, she experienced this pressure again, which lasted for 2 to 3 days before suddenly feeling a pop and noticing the growth of a hemorrhoid. Despite using all her usual treatments, the hemorrhoid did not shrink and remained hard, although it was no longer painful. She reports no bleeding. She also mentions a small skin tag on her left buttock that has been present for a long time. She has never undergone a colonoscopy. She recalls an incident where the pressure was so intense that everything seemed to come out from inside her rectum, resulting in tears in the tissue. However, she reports no bleeding. She is currently scheduled for surgery for carpal and cubital tunnel syndrome and neck issues. She notes that she does not have bowel movement issues when she maintains a good diet and regular exercise routine. However, due to her current condition, she is unable to exercise.    FAMILY HISTORY  Her aunt  "has cancer.  Review of Systems   Constitutional:  Negative for chills and fever.   HENT:  Negative for ear pain and sore throat.    Eyes:  Negative for pain and visual disturbance.   Respiratory:  Negative for cough and shortness of breath.    Cardiovascular:  Negative for chest pain and palpitations.   Gastrointestinal:  Negative for abdominal pain and vomiting.   Genitourinary:  Negative for dysuria and hematuria.   Musculoskeletal:  Negative for arthralgias and back pain.   Skin:  Negative for color change and rash.   Neurological:  Negative for seizures and syncope.   All other systems reviewed and are negative.    Objective   Ht 5' 7\" (1.702 m)   Wt 66.7 kg (147 lb)   BMI 23.02 kg/m²      Physical Exam  Vitals and nursing note reviewed.   Constitutional:       General: She is not in acute distress.     Appearance: She is well-developed.   HENT:      Head: Normocephalic and atraumatic.     Eyes:      Conjunctiva/sclera: Conjunctivae normal.       Cardiovascular:      Rate and Rhythm: Normal rate and regular rhythm.      Heart sounds: No murmur heard.  Pulmonary:      Effort: Pulmonary effort is normal. No respiratory distress.      Breath sounds: Normal breath sounds.   Abdominal:      Palpations: Abdomen is soft.      Tenderness: There is no abdominal tenderness.     Musculoskeletal:         General: No swelling.      Cervical back: Neck supple.     Skin:     General: Skin is warm and dry.      Capillary Refill: Capillary refill takes less than 2 seconds.     Neurological:      Mental Status: She is alert.     Psychiatric:         Mood and Affect: Mood normal.       Physical Exam  A small skin tag is present on the left buttock. A thrombosed external hemorrhoid is noted on the left side.    Results    Administrative Statements   I have spent a total time of 33 minutes in caring for this patient on the day of the visit/encounter including Diagnostic results, Prognosis, Risks and benefits of tx options, " Instructions for management, Patient and family education, Importance of tx compliance, Risk factor reductions, Impressions, Counseling / Coordination of care, Documenting in the medical record, Reviewing/placing orders in the medical record (including tests, medications, and/or procedures), Obtaining or reviewing history  , and Communicating with other healthcare professionals .

## 2025-07-09 NOTE — PATIENT INSTRUCTIONS
High fiber diet/increased hydration, 20-30 grams fiber per day, increased fruits/vegetables    Start fiber supplementation with benefiber. You may put this in your coffee/tea/juice in the morning. Start with 2 tsp once per day. If you experience bloating or gassiness, you may start with 1 tsp once per day. You may increase fiber supplementation to UP TO 2 tsp three times per day in order to achieve 1 formed bowel movement once per day.    Aim to drink at least 64 oz of water per day      High Fiber Diet   AMBULATORY CARE:   A high-fiber diet  includes foods that have a high amount of fiber. Fiber is the part of fruits, vegetables, and grains that is not broken down by your body. Fiber keeps your bowel movements regular. Fiber can also help lower your cholesterol level, control blood sugar in people with diabetes, and relieve constipation. Fiber can also help you control your weight because it helps you feel full faster. Most adults should eat 25 to 35 grams of fiber each day. Talk to your dietitian or healthcare provider about the amount of fiber you need.  Good sources of fiber:       Foods with at least 4 grams of fiber per serving:      ? to ½ cup of high-fiber cereal (check the nutrition label on the box)    ½ cup of blackberries or raspberries    4 dried prunes    1 cooked artichoke    ½ cup of cooked legumes, such as lentils, or red, kidney, and cooper beans    Foods with 1 to 3 grams of fiber per servin slice of whole-wheat, pumpernickel, or rye bread    ½ cup of cooked brown rice    4 whole-wheat crackers    1 cup of oatmeal    ½ cup of cereal with 1 to 3 grams of fiber per serving (check the nutrition label on the box)    1 small piece of fruit, such as an apple, banana, pear, kiwi, or orange    3 dates    ½ cup of canned apricots, fruit cocktail, peaches, or pears    ½ cup of raw or cooked vegetables, such as carrots, cauliflower, cabbage, spinach, squash, or corn  Ways that you can increase fiber  in your diet:   Choose brown or wild rice instead of white rice.     Use whole wheat flour in recipes instead of white or all-purpose flour.     Add beans and peas to casseroles or soups.     Choose fresh fruit and vegetables with peels or skins on instead of juices.    Other diet guidelines to follow:   Add fiber to your diet slowly.  You may have abdominal discomfort, bloating, and gas if you add fiber to your diet too quickly.     Drink plenty of liquids as you add fiber to your diet.  You may have nausea or develop constipation if you do not drink enough water. Ask how much liquid to drink each day and which liquids are best for you.    © Copyright [employer] 2023 Information is for End User's use only and may not be sold, redistributed or otherwise used for commercial purposes.  The above information is an  only. It is not intended as medical advice for individual conditions or treatments. Talk to your doctor, nurse or pharmacist before following any medical regimen to see if it is safe and effective for you.         no

## 2025-07-10 PROCEDURE — NC001 PR NO CHARGE: Performed by: SURGERY

## 2025-07-10 NOTE — H&P
ASSESSMENT/PLAN:       Assessment & Plan  Carpal tunnel syndrome of left wrist  Cubital tunnel syndrome on left  The Us's were reviewed in the office today which demonstrate left carpal and cubital tunnel syndrome. Discussed continued non operative versus surgical intervention. The patient elected to proceed with surgical intervention. Surgical intervention was discussed in the form of left carpal tunnel release and left cubital tunnel release with possible ulnar nerve transposition. The patient elected to proceed with surgical intervention. Risks of the surgery are inclusive of but not limited to bleeding, infection, nerve injury, blood clot, worsening of symptoms, not achieving the anticipated results, persistent stiffness, weakness, pillar pain and the need for additional surgery. The patient verbally stated they understood those risks and would like to proceed with the surgery. Surgical consent was signed in the office today. I will see the patient the day of surgery.                        The patient verbalized understanding of exam findings and treatment plan. We engaged in the shared decision-making process and treatment options were discussed at length with the patient. Surgical and conservative management discussed today along with risks and benefits.     Diagnoses and all orders for this visit:     Carpal tunnel syndrome of left wrist     Cubital tunnel syndrome on left             Cubital Tunnel Release:   The anatomy and physiology of cubital tunnel syndrome were discussed with the patient today in the office.  Typically, increased elbow flexion activities decrease blood flow within the intraneural spaces, resulting in a feeling of numbness, tingling, weakness, or clumsiness within the hand and fingers.  Occasionally, anatomic structures such as medial elbow osteophytes, the medial head of the triceps, were subluxing ulnar nerve may result in increased pressure or aggravation at the cubital tunnel.   Typical signs and symptoms usually include numbness and tingling within the ring and small finger, weakness with , and weakness with pinch.  Conservative treatment and includes nocturnal bracing to keep the elbow in a semi-extended position, activity modification, therapy, and avoiding excessive elbow flexion activities.  A majority of patients typically respond to conservative treatment over a period of approximately 3-6 months.  Vitamin B6 one tablet daily over the counter may helpful to reduce symptoms.  EMG/NCV testing of the ulnar nerve at the elbow is not as reliable as carpal tunnel syndrome.  Surgical intervention in the form of in situ release of the ulnar nerve at the elbow or ulnar nerve transposition may be required in up to 20% of patients. The patient has elected to undergo cubital tunnel release.  The possibility of converting to a subcutaneous or submuscular ulnar nerve transposition depending on the nerve stability was discussed with the patient.  Typically, in the postoperative period, light activities are allowed immediately, driving is allowed when narcotic medications have stopped, and the incision may get wet after 5 days.  Heavy activities will be allowed after follow up appointment in 1-2 weeks.  While the pain within the ring and small finger of the hands generally improves rapidly, the numbness and tingling, as well as the strength, will slowly improve over a period of weeks to months.  Total recovery can take up to 18 months from the time of surgery.  Numbness and tingling near the incision, or near the medial aspect of the forearm was discussed with the patient.  The patient has an understanding of the above mentioned discussion. The risks and benefits of the procedure were explained to the patient, which include, but are not limited to: Bleeding, infection, recurrence, pain, scar, damage to tendons, damage to nerves, and damage to blood vessels, failure to give desired results and  complications related to anesthesia.  These risks, along with alternative conservative treatment options, and postoperative protocols were voiced back and understood by the patient.  All questions were answered to the patient's satisfaction.  The patient agrees to comply with a standard postoperative protocol, and is willing to proceed.  Education was provided via written and auditory forms.  There were no barriers to learning. Written handouts regarding wound care, incision and scar care, and general preoperative information was provided to the patient.  Prior to surgery, the patient may be requested to stop all anti-inflammatory medications.  Prophylactic aspirin, Plavix, and Coumadin may be allowed to be continued.  Medications including vitamin E., ginkgo, and fish oil are requested to be stopped approximately one week prior to surgery.  Hypertensive medications and beta blockers, if taken, should be continued. Vitamin B6 one tablet daily over the counter may helpful to reduce symptoms.   and Open Carpal Tunnel Release:   The anatomy and physiology of carpal tunnel syndrome was discussed with the patient today.  Increase pressure localized under the transverse carpal ligament can cause pain, numbness, tingling, or dysesthesias within the median nerve distribution as well as feelings of fatigue, clumsiness, or awkwardness.  These symptoms typically occur at night and worse in the morning upon waking.  Eventually, untreated carpal tunnel syndrome can result in weakness and permanent loss of muscle within the thenar compartment of the hand.  Treatment options were discussed with the patient.  Conservative treatment includes nocturnal resting splints to keep the nerve in a neutral position, ergonomic changes within the work or home environment, activity modification, and tendon gliding exercises.  Vitamin B6 one tablet daily over the counter may helpful to reduce symptoms.  Steroid injections within the carpal canal  can help a majority of patients, however this is often self-limited in a majority of patients.  Surgical intervention to divide the transverse carpal ligament typically results in a long-lasting relief of the patient's complaints, with the recurrence rate of less than 1%. The patient has elected to undergo an open carpal tunnel release.  The palmar incision technique was discussed in the office with the patient today.   In the postoperative period, light activities are allowed immediately, driving is allowed when narcotic medication has stopped, and the bandages may be removed and incision may get wet after 2 days.  Heavy activities (lifting more than approximately 10 pounds) will be allowed after follow up appointment in 1-2 weeks.  While night symptoms (waking from sleep, pain, and discomfort in the hands) generally improves rapidly, the numbness and tingling as well as the strength will slowly improve over weeks to months depending on the chronicity and severity of the carpal tunnel syndrome.  Pillar pain and scar discomfort were discussed with the patient which are self-limiting conditions.  The risks of bleeding and infection from the surgery are less than 1%.  Risk of recurrence is approximately 0.5%.  The risks of nerve injury or nerve damage or damage to the blood vessels is approximately 1 in 1200. The patient has an understanding of the above mentioned discussion.The risks and benefits of the procedure were explained to the patient, which include, but are not limited to: Bleeding, infection, recurrence, pain, scar, damage to tendons, damage to nerves, and damage to blood vessels, failure to give desired results and complications related to anesthesia.  These risks, along with alternative conservative treatment options, and postoperative protocols were voiced back and understood by the patient.  All questions were answered to the patient's satisfaction.  The patient agrees to comply with a standard  postoperative protocol, and is willing to proceed.  Education was provided via written and auditory forms.  There were no barriers to learning. Written handouts regarding wound care, incision and scar care, and general preoperative information was provided to the patient.  Prior to surgery, the patient may be requested to stop all anti-inflammatory medications.  Prophylactic aspirin, Plavix, and Coumadin may be allowed to be continued.  Medications including vitamin E., ginkgo, and fish oil are requested to be stopped approximately one week prior to surgery.  Hypertensive medications and beta blockers, if taken, s     Follow Up:  Return for After surgery.        To Do Next Visit:  Sutures out     ____________________________________________________________________________________________________________________________________________        CHIEF COMPLAINT:       Chief Complaint   Patient presents with    Left Hand - Numbness       US review         SUBJECTIVE:  Carmen Romero is a 44 y.o. year old ambidextrous  female who presents to the office today for follow up evaluation left hand numbness and tingling. At her last visit, US's were ordered to evaluate for left carpal and cubital tunnel syndrome. The patient notes intermittent numbness and tingling to all digits. She states this is worse at night. She tried night time bracing which helped but caused shoulder pain. She states she feels like she wants to drop objects at times.          I have personally reviewed all the relevant PMH, PSH, SH, FH, Medications and allergies.      PAST MEDICAL HISTORY:  [Past Medical History]    [Past Medical History]       Diagnosis Date    Gestational diabetes      Kidney stone          PAST SURGICAL HISTORY:  [Past Surgical History]    [Past Surgical History]  No past surgical history on file.     FAMILY HISTORY:  [Family History]    [Family History]         Problem Relation Name Age of Onset    Breast cancer Mother   64     Diabetes Father        Brain cancer Sister        No Known Problems Daughter        Lung cancer Maternal Grandfather        Diabetes Paternal Grandmother        Breast cancer Maternal Aunt   50    Cancer Paternal Aunt        Cancer Paternal Aunt            SOCIAL HISTORY:  [Social History]    [Social History]        Tobacco Use    Smoking status: Never    Smokeless tobacco: Never   Vaping Use    Vaping status: Never Used   Substance Use Topics    Alcohol use: Yes       Comment: socially     Drug use: Never        MEDICATIONS:  [Current Medications]    [Current Medications]     Current Outpatient Medications:     cyclobenzaprine (FLEXERIL) 10 mg tablet, Take 1 tablet (10 mg total) by mouth 2 (two) times a day as needed for muscle spasms, Disp: 60 tablet, Rfl: 0    ibuprofen (MOTRIN) 800 mg tablet, Take 800 mg by mouth every 6 (six) hours as needed for mild pain, Disp: , Rfl:     Tart Cherry 1200 MG CAPS, Take by mouth, Disp: , Rfl:     Turmeric 5-1000 MG CAPS, Take 1,000 mg by mouth in the morning and 1,000 mg in the evening., Disp: , Rfl:     diclofenac (VOLTAREN) 75 mg EC tablet, Take 1 tablet (75 mg total) by mouth 2 (two) times a day (Patient not taking: Reported on 5/8/2025), Disp: 60 tablet, Rfl: 1     ALLERGIES:  [Allergies]    [Allergies]  No Known Allergies     REVIEW OF SYSTEMS:  Review of Systems   Constitutional:  Negative for chills and fever.   HENT:  Negative for drooling and sneezing.    Eyes:  Negative for redness.   Respiratory:  Negative for cough and wheezing.    Gastrointestinal:  Negative for nausea and vomiting.   Musculoskeletal:  Negative for arthralgias, joint swelling and myalgias.   Neurological:  Negative for weakness and numbness.   Psychiatric/Behavioral:  Negative for behavioral problems. The patient is not nervous/anxious.          VITALS:  There were no vitals filed for this visit.        _____________________________________________________  PHYSICAL EXAMINATION:  General: well  "developed and well nourished, alert, oriented times 3, and appears comfortable  Psychiatric: Normal  HEENT: Normocephalic, Atraumatic Trachea Midline, No torticollis  Pulmonary: No audible wheezing or respiratory distress   Cardiovascular: No pitting edema, 2+ radial pulse   Abdominal/GI: abdomen non tender, non distended   Skin: No masses, erythema, lacerations, fluctation, ulcerations  Neurovascular: Sensation Intact to the Median, Ulnar, Radial Nerve, Motor Intact to the Median, Ulnar, Radial Nerve, and Pulses Intact  Musculoskeletal: Normal, except as noted in detailed exam and in HPI.        MUSCULOSKELETAL EXAMINATION:  Left Ulnar Nerve Exam:     Negative intrinsic atrophy.  Negative  deformity at the elbow.   Full range of motion with flexion and extension of the elbow.   Positive ulnar nerve compression test at the elbow. Positive tinels over the ulnar nerve at the elbow.  Negative cross finger test in the index and long fingers.   FDP strength is 4+/5 to the ring finger. FDP strength is 4+/5 to the small finger.  Intrinsic strength 4+/5 .     Left Carpal Tunnel Exam:     Negative thenar atrophy.  Phalen's test produces paresthesias in the small and ring finger. Negative carpal tunnel compression. Negative tinels over median nerve at the wrist.  Opposition strength 5/5.  Abduction strength 5/5.      ___________________________________________________     STUDIES REVIEWED:        I have personally reviewed and interpreted  US of left wrist and elbow which demonstrate left carpal and cubital tunnel syndrome.      LABS REVIEWED:     HgA1c: No results found for: \"HGBA1C\"  BMP:         Lab Results   Component Value Date     CALCIUM 9.1 12/02/2023     K 3.9 12/02/2023     CO2 27 12/02/2023      12/02/2023     BUN 26 (H) 12/02/2023     CREATININE 0.91 12/02/2023                  PROCEDURES PERFORMED:  Procedures  No Procedures performed today  "

## 2025-07-11 ENCOUNTER — HOSPITAL ENCOUNTER (OUTPATIENT)
Age: 45
Setting detail: OUTPATIENT SURGERY
Discharge: HOME | End: 2025-07-11
Attending: SURGERY | Admitting: SURGERY
Payer: COMMERCIAL

## 2025-07-11 ENCOUNTER — ANESTHESIA (OUTPATIENT)
Age: 45
End: 2025-07-11
Payer: COMMERCIAL

## 2025-07-11 VITALS
WEIGHT: 147.8 LBS | DIASTOLIC BLOOD PRESSURE: 56 MMHG | OXYGEN SATURATION: 96 % | HEART RATE: 69 BPM | RESPIRATION RATE: 17 BRPM | HEIGHT: 67 IN | SYSTOLIC BLOOD PRESSURE: 96 MMHG | TEMPERATURE: 97.3 F | BODY MASS INDEX: 23.2 KG/M2

## 2025-07-11 DIAGNOSIS — G56.02 CARPAL TUNNEL SYNDROME OF LEFT WRIST: ICD-10-CM

## 2025-07-11 DIAGNOSIS — G56.22 CUBITAL TUNNEL SYNDROME ON LEFT: Primary | ICD-10-CM

## 2025-07-11 DIAGNOSIS — Z47.89 AFTERCARE FOLLOWING SURGERY OF THE MUSCULOSKELETAL SYSTEM: ICD-10-CM

## 2025-07-11 LAB
EXT PREGNANCY TEST URINE: NEGATIVE
EXT. CONTROL: NORMAL

## 2025-07-11 PROCEDURE — 64718 REVISE ULNAR NERVE AT ELBOW: CPT | Performed by: PHYSICIAN ASSISTANT

## 2025-07-11 PROCEDURE — 81025 URINE PREGNANCY TEST: CPT | Performed by: SURGERY

## 2025-07-11 PROCEDURE — 64718 REVISE ULNAR NERVE AT ELBOW: CPT | Performed by: SURGERY

## 2025-07-11 PROCEDURE — 64721 CARPAL TUNNEL SURGERY: CPT | Performed by: PHYSICIAN ASSISTANT

## 2025-07-11 PROCEDURE — 64721 CARPAL TUNNEL SURGERY: CPT | Performed by: SURGERY

## 2025-07-11 RX ORDER — LIDOCAINE HYDROCHLORIDE 10 MG/ML
INJECTION, SOLUTION EPIDURAL; INFILTRATION; INTRACAUDAL; PERINEURAL AS NEEDED
Status: DISCONTINUED | OUTPATIENT
Start: 2025-07-11 | End: 2025-07-11

## 2025-07-11 RX ORDER — SODIUM CHLORIDE, SODIUM LACTATE, POTASSIUM CHLORIDE, CALCIUM CHLORIDE 600; 310; 30; 20 MG/100ML; MG/100ML; MG/100ML; MG/100ML
INJECTION, SOLUTION INTRAVENOUS CONTINUOUS PRN
Status: DISCONTINUED | OUTPATIENT
Start: 2025-07-11 | End: 2025-07-11

## 2025-07-11 RX ORDER — PROPOFOL 10 MG/ML
INJECTION, EMULSION INTRAVENOUS AS NEEDED
Status: DISCONTINUED | OUTPATIENT
Start: 2025-07-11 | End: 2025-07-11

## 2025-07-11 RX ORDER — PROPOFOL 10 MG/ML
INJECTION, EMULSION INTRAVENOUS CONTINUOUS PRN
Status: DISCONTINUED | OUTPATIENT
Start: 2025-07-11 | End: 2025-07-11

## 2025-07-11 RX ORDER — CEFAZOLIN SODIUM 2 G/50ML
2000 SOLUTION INTRAVENOUS ONCE
Status: COMPLETED | OUTPATIENT
Start: 2025-07-11 | End: 2025-07-11

## 2025-07-11 RX ORDER — CHLORHEXIDINE GLUCONATE ORAL RINSE 1.2 MG/ML
15 SOLUTION DENTAL ONCE
Status: COMPLETED | OUTPATIENT
Start: 2025-07-11 | End: 2025-07-11

## 2025-07-11 RX ORDER — HYDROCODONE BITARTRATE AND ACETAMINOPHEN 5; 325 MG/1; MG/1
1 TABLET ORAL EVERY 6 HOURS PRN
Qty: 10 TABLET | Refills: 0 | Status: SHIPPED | OUTPATIENT
Start: 2025-07-11 | End: 2025-07-21

## 2025-07-11 RX ORDER — ONDANSETRON 2 MG/ML
4 INJECTION INTRAMUSCULAR; INTRAVENOUS ONCE AS NEEDED
Status: DISCONTINUED | OUTPATIENT
Start: 2025-07-11 | End: 2025-07-11 | Stop reason: HOSPADM

## 2025-07-11 RX ORDER — FENTANYL CITRATE 50 UG/ML
INJECTION, SOLUTION INTRAMUSCULAR; INTRAVENOUS
Status: COMPLETED | OUTPATIENT
Start: 2025-07-11 | End: 2025-07-11

## 2025-07-11 RX ORDER — ONDANSETRON 2 MG/ML
INJECTION INTRAMUSCULAR; INTRAVENOUS AS NEEDED
Status: DISCONTINUED | OUTPATIENT
Start: 2025-07-11 | End: 2025-07-11

## 2025-07-11 RX ORDER — MIDAZOLAM HYDROCHLORIDE 2 MG/2ML
INJECTION, SOLUTION INTRAMUSCULAR; INTRAVENOUS
Status: COMPLETED | OUTPATIENT
Start: 2025-07-11 | End: 2025-07-11

## 2025-07-11 RX ORDER — BUPIVACAINE HYDROCHLORIDE 5 MG/ML
INJECTION, SOLUTION EPIDURAL; INTRACAUDAL; PERINEURAL
Status: COMPLETED | OUTPATIENT
Start: 2025-07-11 | End: 2025-07-11

## 2025-07-11 RX ORDER — HYDROCODONE BITARTRATE AND ACETAMINOPHEN 5; 325 MG/1; MG/1
1 TABLET ORAL EVERY 6 HOURS PRN
Refills: 0 | Status: DISCONTINUED | OUTPATIENT
Start: 2025-07-11 | End: 2025-07-11 | Stop reason: HOSPADM

## 2025-07-11 RX ORDER — FENTANYL CITRATE/PF 50 MCG/ML
25 SYRINGE (ML) INJECTION
Status: DISCONTINUED | OUTPATIENT
Start: 2025-07-11 | End: 2025-07-11 | Stop reason: HOSPADM

## 2025-07-11 RX ORDER — MAGNESIUM HYDROXIDE 1200 MG/15ML
LIQUID ORAL AS NEEDED
Status: DISCONTINUED | OUTPATIENT
Start: 2025-07-11 | End: 2025-07-11 | Stop reason: HOSPADM

## 2025-07-11 RX ORDER — SODIUM CHLORIDE 9 MG/ML
75 INJECTION, SOLUTION INTRAVENOUS CONTINUOUS
Status: DISCONTINUED | OUTPATIENT
Start: 2025-07-11 | End: 2025-07-11 | Stop reason: HOSPADM

## 2025-07-11 RX ADMIN — PROPOFOL 100 MCG/KG/MIN: 10 INJECTION, EMULSION INTRAVENOUS at 10:51

## 2025-07-11 RX ADMIN — BUPIVACAINE 10 ML: 13.3 INJECTION, SUSPENSION, LIPOSOMAL INFILTRATION at 10:05

## 2025-07-11 RX ADMIN — FENTANYL CITRATE 25 MCG: 50 INJECTION INTRAMUSCULAR; INTRAVENOUS at 10:55

## 2025-07-11 RX ADMIN — PROPOFOL 60 MG: 10 INJECTION, EMULSION INTRAVENOUS at 10:51

## 2025-07-11 RX ADMIN — CHLORHEXIDINE GLUCONATE 15 ML: 1.2 SOLUTION ORAL at 09:12

## 2025-07-11 RX ADMIN — CEFAZOLIN SODIUM 2000 MG: 2 SOLUTION INTRAVENOUS at 10:52

## 2025-07-11 RX ADMIN — BUPIVACAINE HYDROCHLORIDE 20 ML: 5 INJECTION, SOLUTION EPIDURAL; INTRACAUDAL; PERINEURAL at 10:05

## 2025-07-11 RX ADMIN — ONDANSETRON 4 MG: 2 INJECTION INTRAMUSCULAR; INTRAVENOUS at 10:51

## 2025-07-11 RX ADMIN — MIDAZOLAM 2 MG: 1 INJECTION INTRAMUSCULAR; INTRAVENOUS at 09:55

## 2025-07-11 RX ADMIN — LIDOCAINE HYDROCHLORIDE 50 MG: 10 INJECTION, SOLUTION EPIDURAL; INFILTRATION; INTRACAUDAL; PERINEURAL at 10:51

## 2025-07-11 RX ADMIN — SODIUM CHLORIDE 75 ML/HR: 0.9 INJECTION, SOLUTION INTRAVENOUS at 09:12

## 2025-07-11 RX ADMIN — SODIUM CHLORIDE, SODIUM LACTATE, POTASSIUM CHLORIDE, AND CALCIUM CHLORIDE: .6; .31; .03; .02 INJECTION, SOLUTION INTRAVENOUS at 09:55

## 2025-07-11 RX ADMIN — FENTANYL CITRATE 50 MCG: 50 INJECTION INTRAMUSCULAR; INTRAVENOUS at 10:00

## 2025-07-11 NOTE — ANESTHESIA POSTPROCEDURE EVALUATION
Post-Op Assessment Note    CV Status:  Stable    Pain management: adequate       Mental Status:  Alert and awake   Hydration Status:  Euvolemic   PONV Controlled:  Controlled   Airway Patency:  Patent     Post Op Vitals Reviewed: Yes    No anethesia notable event occurred.    Staff: Anesthesiologist           Last Filed PACU Vitals:  Vitals Value Taken Time   Temp 97.8 °F (36.6 °C) 07/11/25 12:00   Pulse 74 07/11/25 12:00   BP 97/62 07/11/25 12:00   Resp 15 07/11/25 12:00   SpO2 100 % 07/11/25 12:00       Modified Marissa:     Vitals Value Taken Time   Activity 2 07/11/25 12:00   Respiration 2 07/11/25 12:00   Circulation 2 07/11/25 12:00   Consciousness 2 07/11/25 12:00   Oxygen Saturation 2 07/11/25 12:00     Modified Marissa Score: 10

## 2025-07-11 NOTE — ANESTHESIA PROCEDURE NOTES
Peripheral Block    Patient location during procedure: holding area  Start time: 7/11/2025 9:50 AM  Reason for block: at surgeon's request and post-op pain management  Staffing  Performed by: Sim Tapia MD  Authorized by: Sim Tapia MD    Preanesthetic Checklist  Completed: patient identified, IV checked, site marked, risks and benefits discussed, surgical consent, monitors and equipment checked, pre-op evaluation and timeout performed  Peripheral Block  Patient position: sitting  Prep: ChloraPrep  Patient monitoring: frequent blood pressure checks, continuous pulse oximetry and heart rate  Block type: Supraclavicular (+ Intercostobrachial)  Laterality: left  Injection technique: single-shot  Procedures: ultrasound guided, Ultrasound guidance required for the procedure to increase accuracy and safety of medication placement and decrease risk of complications.  Ultrasound permanent image saved  midazolam (VERSED) injection 0.5 mg - Intravenous   2 mg - 7/11/2025 9:55:00 AM  fentanyl citrate (PF) 100 MCG/2ML 50 mcg - Intravenous   50 mcg - 7/11/2025 10:00:00 AM  bupivacaine (PF) (MARCAINE) 0.5 % injection 20 mL - Perineural   20 mL - 7/11/2025 10:05:00 AM  bupivacaine liposomal (EXPAREL) 1.3 % injection 20 mL - Perineural   10 mL - 7/11/2025 10:05:00 AM  Needle  Needle type: Stimuplex   Needle gauge: 22 G  Needle length: 2 in  Needle localization: anatomical landmarks and ultrasound guidance  Assessment  Injection assessment: incremental injection, frequent aspiration, injected with ease, negative aspiration, negative for heart rate change, no paresthesia on injection, no symptoms of intraneural/intravenous injection and needle tip visualized at all times  Paresthesia pain: none  Post-procedure:  site cleaned  patient tolerated the procedure well with no immediate complications

## 2025-07-11 NOTE — DISCHARGE INSTR - AVS FIRST PAGE
Post Operative Instructions    You have had surgery on your arm today, please read and follow the information below:  Elevate your hand above your elbow during the next 24-48 hours to help with swelling.  Place your hand and arm over your head with motion at your shoulder three times a day.  Do not apply any cream/ointment/oil to your incisions including antibiotics.  Do not soak your hands in standing water (dishwater, tubs, Jacuzzi's, pools, etc.) until given permission (typically 2-3 weeks after injury)    Call the office if you notice any:  Increased numbness or tingling of your hand or fingers that is not relieved with elevation.  Increasing pain that is not controlled with medication.  Difficulty chewing, breathing, swallowing.  Chest pains or shortness of breath.  Fever over 101.4 degrees.    Bandage: Please keep bandages clean and dry. Remove bandage after 5 days. Once bandages are removed you may wash hands with soap and water. Short showers are okay as well, but please avoid soaking the hand as described above (ie no pools, baths, dirty dish water, hot tubs, ocean/lake water, etc). Sutures will be removed in the office at your first follow up visit, please do not remove them yourself.    Please do NOT put any topical agents on the surgical wound including neosporin, peroxide, tea tree oil, vitamin E, etc. as these can delay wound healing.    Motion: Move fingers into a fist 5 times a day, DO NOT move any splinted fingers.    Weight bearing status: Avoid heavy lifting (>5 pounds) with the extremity that was operated on until follow up appointment. Normal activities of daily living are OK.    Ice: Ice for 10 minutes every hour as needed for swelling x 24 hours.    Sling: Please use your sling while your arm is numb from the block. When your arm is FULLY awake again, you no longer need this and may use your sling as needed for comfort. While using the sling, make sure to move your shoulder throughout the day  to prevent stiffness here.     Pain medication:   Naproxen 220 mg two times a day (this is over the counter!)  *do not take this medication if you were told by your doctor that you cannot take anti-inflammatories or NSAIDS  Tylenol Extended Release 650 mg every 8 hours (this is over the counter!)   Norco/Hydrocodone one tab every 6 hours ONLY AS NEEDED for severe pain        Follow-up Appointment: 10-14 days with Dr Hoyt        Please call the office if you have any questions or concerns regarding your post-operative care.

## 2025-07-11 NOTE — ANESTHESIA POSTPROCEDURE EVALUATION
Post-Op Assessment Note    CV Status:  Stable  Pain Score: 0    Pain management: adequate       Mental Status:  Alert and awake   Hydration Status:  Euvolemic   PONV Controlled:  Controlled   Airway Patency:  Patent     Post Op Vitals Reviewed: Yes    No anethesia notable event occurred.    Staff: CRNA           Last Filed PACU Vitals:  Vitals Value Taken Time   Temp     Pulse 70    /59    Resp 16    SpO2 99%

## 2025-07-11 NOTE — INTERVAL H&P NOTE
H&P reviewed. After examining the patient I find no changes in the patients condition since the H&P had been written.    Vitals:    07/11/25 0900   BP: 102/58   Pulse: 85   Resp: 16   Temp: 99 °F (37.2 °C)   SpO2: 99%

## 2025-07-11 NOTE — ANESTHESIA PREPROCEDURE EVALUATION
Procedure:  Left cubital tunnel release, possible anterior ulnar nerve transposition with pedicle based adipose flap (Left: Elbow)  RELEASE CARPAL TUNNEL, left (Left: Wrist)    No h/o anesthesia in the past, no family h/o anesthesia complications     Has pinched nerve in C5-C6 level resulting in neck pain with overextension     Relevant Problems   NEURO/PSYCH   (+) Syrinx of spinal cord (HCC)        Physical Exam    Airway     Mallampati score: III  TM Distance: >3 FB  Neck ROM: full  Mouth opening: >= 4 cm      Cardiovascular  Cardiovascular exam normal    Dental   No notable dental hx     Pulmonary  Pulmonary exam normal     Neurological  - normal exam    Other Findings  post-pubertal.      Anesthesia Plan  ASA Score- 2     Anesthesia Type- regional with ASA Monitors.         Additional Monitors:     Airway Plan: natural airway.           Plan Factors-Exercise tolerance (METS): >4 METS.    Chart reviewed.   Existing labs reviewed. Patient summary reviewed.    Patient is not a current smoker.              Induction-     Postoperative Plan- .   Monitoring Plan - Monitoring plan - standard ASA monitoring  Post Operative Pain Plan - peripheral nerve block and multimodal analgesia        Informed Consent- Anesthetic plan and risks discussed with patient.  I personally reviewed this patient with the CRNA. Discussed and agreed on the Anesthesia Plan with the CRNA..      NPO Status:  Vitals Value Taken Time   Date of last liquid 07/10/25 07/11/25 09:01   Time of last liquid 2330 07/11/25 09:01   Date of last solid 07/10/25 07/11/25 09:01   Time of last solid 2230 07/11/25 09:01

## 2025-07-11 NOTE — OP NOTE
OPERATIVE REPORT  PATIENT NAME: Carmen Romero  :  1980  MRN: [id number]  Pt Location: [facility]    SURGERY DATE: 25    Surgeons and Role:     * Kamla Hoyt MD - Primary     * Carol Christine PA-C - Assisting     * Christine Iglesias MD - Observing    Pre-Op Diagnosis:  Carpal tunnel syndrome of left wrist [G56.02]  Cubital tunnel syndrome on left [G56.22]    Post-Op Diagnosis Codes:     * Carpal tunnel syndrome of left wrist [G56.02]     * Cubital tunnel syndrome on left [G56.22]    Procedure(s):  Left cubital tunnel release (Left)  RELEASE CARPAL TUNNEL, left (Left)    Specimen(s):  No specimens collected during this procedure.    Estimated Blood Loss:   Minimal    Anesthesia Type:   Regional with Sedation    IMPLANTS:  * No implants in log *    PERIOPERATIVE ANTIBIOTICS:    cefazolin, 2 grams    Tourniquet Time: 25 min medium hemaclear          Operative Indications:  The patient has a history of Carpal Tunnel Syndrome  left and Cubital Tunnel Syndrome  left that was recalcitrant to conservative management.  The decision was made to bring the patient to the operating room for Open Carpal Tunnel Release  left and Cubital Tunnel Release  left.  Risks of the procedure were explained which include, but are not limited to bleeding; infection; damage to nerves, arteries,veins, tendons; scar; pain; need for reoperation; failure to give desired result; and risks of anaesthesia.  All questions were answered to satisfaction and they were willing to proceed.         Operative Findings:  Hypertrophied transverse carpal ligament  Nerve enlargement with compression at deep fibers of the FCU and Claire's fascia    Complications:   None    Procedure and Technique:  After the patient, site, and procedure were identified, the patient was brought into the operating room in a supine position.  Regional anaesthesia and sedation were provided.    The  left upper extremity was then prepped and drapped in a normal,  sterile, orthopedic fashion.    A medium Hemoclear was applied in sterile fashion    A 4 cm Incision was made with a 15 blade between medial epicondyle and olecranon.  A  branch of the MABC was identified distally and protected.  The ulnar nerve was identified at the level of the cubital tunnel.  The nerve was not found to be subluxed.  The Ulnar nerve was traced 1st traced proximally.  Proximal release was performed under direct visualization  the overlying fascia was released with a scissor up to the level of the intermuscular septum which was incised.  Next dissection was taken proximally both with a scissor and with a 64 blade.  Care was taken preserve any crossing branches of the medial antebrachial cutaneous nerve.  Decompression was taken around the olecranon. An anconeus epitrochlearis was note. The superficial fascia overlying the muscle was release and the muscle gently dissected away.  The deep fascia of the anconeus epitrochlear areas was then gently released under direct visualization.  Dissection was then taken distal to the olecranon, to the FCU.  The superficial layer of the  FCU fascia was released with a knife and the 2 bellies of the FCU spread gently with a scissor.  The deep fascia of the FCU was identified and with the nerve protected all times incised.  Release was taken just past the level of the 1st motor branch to the FCU.  Once a complete release was verified,  the Elbow was taken through a full range of motion and the nerve was found to be stable with no subluxation. After the release, it was appreciated that the nerve had been significantly constricted at the level of Claire's fascia and deep FCU fascia with flattening and enlargement of the nerve the tourniquet was brought down and hemostasis was obtained. The wound was copiously irrigated and closed in a layered fashion with 3-0 monocryl  for deep dermal .     An anterior approach to the carpal tunnel was undertaken. A 1.5 cm incision  was made in line with the fourth digit, proximal to Busch's line.  The skin and the subcutaneous tissue were sharply incised.  Under loupe magnification, dissection was carried down to the palmar fascia and it was incised. The transverse carpal ligament was visualized and  Released distally with a #64 blade. A freer was was passed above and below the TCL in a retrograde fashion, freeing up any adhesions. A Knife Lite was used to release the proximal portion of the transverse carpal ligament under direct visualization.  Distally the superficial arch was identified.  A complete release was carried out and exploration of the carpal canal revealed no significant tenosynovitis. The median nerve and its branches were intact.        At the completion of the procedure, hemostasis was obtained with cautery and direct pressure.  The wounds were copiously irrigated with sterile solution.  The wounds were closed with Prolene Monocryl and Steri-Strips.  Sterile dressings were applied, including Xeroform, gauze, tweeners, webril, ACE and Sling.  Please note, all sponge, needle, and instrument counts were correct prior to closure.  Loupe magnification was utilized.  The patient tolerated the procedure well.     I was present for the entire procedure. and A physician assistant was required during the procedure for retraction, tissue handling, dissection and suturing.    Patient Disposition:  PACU     SIGNATURE: Kamla Hoyt MD  DATE: 07/11/25  TIME: 11:33 AM

## 2025-07-24 ENCOUNTER — OFFICE VISIT (OUTPATIENT)
Dept: OBGYN CLINIC | Facility: CLINIC | Age: 45
End: 2025-07-24

## 2025-07-24 VITALS — HEIGHT: 67 IN | BODY MASS INDEX: 23.18 KG/M2 | WEIGHT: 147.71 LBS

## 2025-07-24 DIAGNOSIS — G56.22 CUBITAL TUNNEL SYNDROME ON LEFT: ICD-10-CM

## 2025-07-24 DIAGNOSIS — G56.02 CARPAL TUNNEL SYNDROME OF LEFT WRIST: Primary | ICD-10-CM

## 2025-07-24 PROCEDURE — 99024 POSTOP FOLLOW-UP VISIT: CPT | Performed by: SURGERY

## 2025-07-24 NOTE — PROGRESS NOTES
"Assessment/Plan:  Patient ID: Carmen Romero 44 y.o. female   Surgery: Left cubital tunnel release - Left and RELEASE CARPAL TUNNEL, left - Left  Date of Surgery: 7/11/2025      Assessment & Plan  Carpal tunnel syndrome of left wrist  Well-healed incision  Does have some pillar pain  Encourage scar tissue massage       Cubital tunnel syndrome on left  Persistent paresthesias ulnar 2 digits unsure if its significantly improved after surgery  Some weakness in the hand with composite fist will observe and have her do a home exercise program she will reach back out if she is continue to struggle with  strength and 2 to 3 weeks             CHIEF COMPLAINT:  Chief Complaint   Patient presents with    Left Hand - Numbness, Post-op     Patient removed stitches, doing exercises, pain only when touched, pain score 3    Left Elbow - Pain     Pain started yesterday, constant dull ache, pain score 5         SUBJECTIVE:  Carmen Romero is a 44 y.o. year old female who presents for follow up after Left cubital tunnel release - Left and RELEASE CARPAL TUNNEL, left - Left. Today patient has persistent ulnar nerve associated paresthesias of the small and ring fingers.  Has some dull ache at the elbow but this is associated with shoulder and upper inner arm pain as well      PHYSICAL EXAMINATION:  General: well developed and well nourished, alert, oriented times 3, and appears comfortable  Psychiatric: Normal    MUSCULOSKELETAL EXAMINATION:  Incision: Clean, dry, intact  Surgery Site: normal, no evidence of infection   Range of Motion: As expected  Neurovascular status: Neuro intact, good cap refill  Activity Restrictions: No restrictions    LABS REVIEWED:    HgA1c: No results found for: \"HGBA1C\"  BMP:   Lab Results   Component Value Date    CALCIUM 9.1 12/02/2023    K 3.9 12/02/2023    CO2 27 12/02/2023     12/02/2023    BUN 26 (H) 12/02/2023    CREATININE 0.91 12/02/2023           PROCEDURES PERFORMED:  Procedures  No " Procedures performed today    Scribe Attestation      I,:   am acting as a scribe while in the presence of the attending physician.:       I,:   personally performed the services described in this documentation    as scribed in my presence.:

## 2025-07-24 NOTE — ASSESSMENT & PLAN NOTE
Persistent paresthesias ulnar 2 digits unsure if its significantly improved after surgery  Some weakness in the hand with composite fist will observe and have her do a home exercise program she will reach back out if she is continue to struggle with  strength and 2 to 3 weeks

## (undated) DEVICE — Device

## (undated) DEVICE — PACK PBDS PLASTIC HAND RF